# Patient Record
Sex: MALE | Race: WHITE | NOT HISPANIC OR LATINO | Employment: FULL TIME | URBAN - METROPOLITAN AREA
[De-identification: names, ages, dates, MRNs, and addresses within clinical notes are randomized per-mention and may not be internally consistent; named-entity substitution may affect disease eponyms.]

---

## 2023-09-01 DIAGNOSIS — B00.9 HSV (HERPES SIMPLEX VIRUS) INFECTION: Primary | ICD-10-CM

## 2023-09-01 RX ORDER — VALACYCLOVIR HYDROCHLORIDE 500 MG/1
500 TABLET, FILM COATED ORAL 2 TIMES DAILY
Qty: 30 TABLET | Refills: 3 | Status: SHIPPED | OUTPATIENT
Start: 2023-09-01 | End: 2023-09-19

## 2023-09-19 DIAGNOSIS — B00.9 HSV (HERPES SIMPLEX VIRUS) INFECTION: ICD-10-CM

## 2023-09-19 RX ORDER — VALACYCLOVIR HYDROCHLORIDE 500 MG/1
500 TABLET, FILM COATED ORAL 2 TIMES DAILY
Qty: 180 TABLET | Refills: 1 | Status: SHIPPED | OUTPATIENT
Start: 2023-09-19 | End: 2023-11-22 | Stop reason: SDUPTHER

## 2023-11-22 ENCOUNTER — LAB (OUTPATIENT)
Dept: LAB | Facility: LAB | Age: 68
End: 2023-11-22
Payer: MEDICARE

## 2023-11-22 ENCOUNTER — OFFICE VISIT (OUTPATIENT)
Dept: PRIMARY CARE | Facility: CLINIC | Age: 68
End: 2023-11-22
Payer: MEDICARE

## 2023-11-22 VITALS
DIASTOLIC BLOOD PRESSURE: 82 MMHG | OXYGEN SATURATION: 98 % | WEIGHT: 180 LBS | SYSTOLIC BLOOD PRESSURE: 120 MMHG | BODY MASS INDEX: 25.83 KG/M2 | HEART RATE: 73 BPM

## 2023-11-22 DIAGNOSIS — Z12.11 COLON CANCER SCREENING: ICD-10-CM

## 2023-11-22 DIAGNOSIS — E55.9 MILD VITAMIN D DEFICIENCY: ICD-10-CM

## 2023-11-22 DIAGNOSIS — Z13.0 SCREENING FOR DEFICIENCY ANEMIA: ICD-10-CM

## 2023-11-22 DIAGNOSIS — Z00.00 MEDICARE ANNUAL WELLNESS VISIT, SUBSEQUENT: Primary | ICD-10-CM

## 2023-11-22 DIAGNOSIS — Z12.5 PROSTATE CANCER SCREENING: ICD-10-CM

## 2023-11-22 DIAGNOSIS — R53.83 OTHER FATIGUE: ICD-10-CM

## 2023-11-22 DIAGNOSIS — Z13.6 ENCOUNTER FOR ABDOMINAL AORTIC ANEURYSM (AAA) SCREENING: ICD-10-CM

## 2023-11-22 DIAGNOSIS — N52.9 ERECTILE DYSFUNCTION, UNSPECIFIED ERECTILE DYSFUNCTION TYPE: ICD-10-CM

## 2023-11-22 DIAGNOSIS — B00.9 HSV (HERPES SIMPLEX VIRUS) INFECTION: ICD-10-CM

## 2023-11-22 DIAGNOSIS — E78.5 DYSLIPIDEMIA: ICD-10-CM

## 2023-11-22 DIAGNOSIS — D16.5 AMELOBLASTOMA: ICD-10-CM

## 2023-11-22 LAB
ALBUMIN SERPL BCP-MCNC: 4.7 G/DL (ref 3.4–5)
ALP SERPL-CCNC: 50 U/L (ref 33–136)
ALT SERPL W P-5'-P-CCNC: 17 U/L (ref 10–52)
ANION GAP SERPL CALC-SCNC: 13 MMOL/L (ref 10–20)
AST SERPL W P-5'-P-CCNC: 20 U/L (ref 9–39)
BILIRUB SERPL-MCNC: 0.8 MG/DL (ref 0–1.2)
BUN SERPL-MCNC: 18 MG/DL (ref 6–23)
CALCIUM SERPL-MCNC: 9.7 MG/DL (ref 8.6–10.6)
CHLORIDE SERPL-SCNC: 104 MMOL/L (ref 98–107)
CHOLEST SERPL-MCNC: 213 MG/DL (ref 0–199)
CHOLESTEROL/HDL RATIO: 4.9
CO2 SERPL-SCNC: 28 MMOL/L (ref 21–32)
CREAT SERPL-MCNC: 0.98 MG/DL (ref 0.5–1.3)
ERYTHROCYTE [DISTWIDTH] IN BLOOD BY AUTOMATED COUNT: 12.8 % (ref 11.5–14.5)
GFR SERPL CREATININE-BSD FRML MDRD: 85 ML/MIN/1.73M*2
GLUCOSE SERPL-MCNC: 94 MG/DL (ref 74–99)
HCT VFR BLD AUTO: 44.1 % (ref 41–52)
HDLC SERPL-MCNC: 43.3 MG/DL
HGB BLD-MCNC: 14.8 G/DL (ref 13.5–17.5)
LDLC SERPL CALC-MCNC: 149 MG/DL
MCH RBC QN AUTO: 30.4 PG (ref 26–34)
MCHC RBC AUTO-ENTMCNC: 33.6 G/DL (ref 32–36)
MCV RBC AUTO: 91 FL (ref 80–100)
NON HDL CHOLESTEROL: 170 MG/DL (ref 0–149)
NRBC BLD-RTO: 0 /100 WBCS (ref 0–0)
PLATELET # BLD AUTO: 260 X10*3/UL (ref 150–450)
POTASSIUM SERPL-SCNC: 5 MMOL/L (ref 3.5–5.3)
PROT SERPL-MCNC: 7 G/DL (ref 6.4–8.2)
RBC # BLD AUTO: 4.87 X10*6/UL (ref 4.5–5.9)
SODIUM SERPL-SCNC: 140 MMOL/L (ref 136–145)
TRIGL SERPL-MCNC: 106 MG/DL (ref 0–149)
VLDL: 21 MG/DL (ref 0–40)
WBC # BLD AUTO: 6.5 X10*3/UL (ref 4.4–11.3)

## 2023-11-22 PROCEDURE — 1170F FXNL STATUS ASSESSED: CPT | Performed by: STUDENT IN AN ORGANIZED HEALTH CARE EDUCATION/TRAINING PROGRAM

## 2023-11-22 PROCEDURE — 99213 OFFICE O/P EST LOW 20 MIN: CPT | Performed by: STUDENT IN AN ORGANIZED HEALTH CARE EDUCATION/TRAINING PROGRAM

## 2023-11-22 PROCEDURE — 80053 COMPREHEN METABOLIC PANEL: CPT

## 2023-11-22 PROCEDURE — 1159F MED LIST DOCD IN RCRD: CPT | Performed by: STUDENT IN AN ORGANIZED HEALTH CARE EDUCATION/TRAINING PROGRAM

## 2023-11-22 PROCEDURE — 85027 COMPLETE CBC AUTOMATED: CPT

## 2023-11-22 PROCEDURE — G0103 PSA SCREENING: HCPCS

## 2023-11-22 PROCEDURE — 80061 LIPID PANEL: CPT

## 2023-11-22 PROCEDURE — 82306 VITAMIN D 25 HYDROXY: CPT

## 2023-11-22 PROCEDURE — 84443 ASSAY THYROID STIM HORMONE: CPT

## 2023-11-22 PROCEDURE — 84154 ASSAY OF PSA FREE: CPT

## 2023-11-22 PROCEDURE — G0439 PPPS, SUBSEQ VISIT: HCPCS | Performed by: STUDENT IN AN ORGANIZED HEALTH CARE EDUCATION/TRAINING PROGRAM

## 2023-11-22 PROCEDURE — 36415 COLL VENOUS BLD VENIPUNCTURE: CPT

## 2023-11-22 RX ORDER — SILDENAFIL 100 MG/1
TABLET, FILM COATED ORAL
COMMUNITY
Start: 2019-12-18 | End: 2023-11-22 | Stop reason: SDUPTHER

## 2023-11-22 RX ORDER — VALACYCLOVIR HYDROCHLORIDE 500 MG/1
1 TABLET, FILM COATED ORAL 2 TIMES DAILY
COMMUNITY
Start: 2016-09-21 | End: 2023-11-22 | Stop reason: SDUPTHER

## 2023-11-22 RX ORDER — SILDENAFIL 100 MG/1
100 TABLET, FILM COATED ORAL
Qty: 20 TABLET | Refills: 0 | Status: SHIPPED | OUTPATIENT
Start: 2023-11-22

## 2023-11-22 RX ORDER — IBUPROFEN 600 MG/1
600 TABLET ORAL EVERY 6 HOURS PRN
COMMUNITY
Start: 2023-06-07 | End: 2023-11-22 | Stop reason: ALTCHOICE

## 2023-11-22 RX ORDER — ASPIRIN 325 MG
TABLET, DELAYED RELEASE (ENTERIC COATED) ORAL
COMMUNITY
Start: 2014-02-18 | End: 2023-11-22 | Stop reason: ALTCHOICE

## 2023-11-22 RX ORDER — VALACYCLOVIR HYDROCHLORIDE 500 MG/1
500 TABLET, FILM COATED ORAL 2 TIMES DAILY
Qty: 180 TABLET | Refills: 1 | Status: SHIPPED | OUTPATIENT
Start: 2023-11-22

## 2023-11-22 ASSESSMENT — PATIENT HEALTH QUESTIONNAIRE - PHQ9
SUM OF ALL RESPONSES TO PHQ9 QUESTIONS 1 AND 2: 0
1. LITTLE INTEREST OR PLEASURE IN DOING THINGS: NOT AT ALL
2. FEELING DOWN, DEPRESSED OR HOPELESS: NOT AT ALL

## 2023-11-22 ASSESSMENT — ACTIVITIES OF DAILY LIVING (ADL)
GROCERY_SHOPPING: INDEPENDENT
BATHING: INDEPENDENT
DRESSING: INDEPENDENT
MANAGING_FINANCES: INDEPENDENT
DOING_HOUSEWORK: INDEPENDENT
TAKING_MEDICATION: INDEPENDENT

## 2023-11-22 ASSESSMENT — ENCOUNTER SYMPTOMS
DEPRESSION: 0
OCCASIONAL FEELINGS OF UNSTEADINESS: 0
LOSS OF SENSATION IN FEET: 0

## 2023-11-22 NOTE — PROGRESS NOTES
Chris Zabala is a 68 y.o. male seen in Clinic at [facility] [place] by [place]. 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 He presents today for CPE/MCW visit.     #Ameloblastoma   - following with [facility]  - pending surgical resection  [  ] If in need of second or alternate opinion through , will let me know     #Sexual Dysfunction   - PDE5 refill today    - prior hernia operation he thinks may be implicated, 30 years ago   - negative cardiac ROS   - prior testosterone level WNL in 2022     #DLD  - not on statin   [ ] repeat labs today   [ ] consider CT coronary calcium scoring      #HSV  - on Valtrex; refill today    Past Medical History: as above   Past Medical History:   Diagnosis Date    Acute maxillary sinusitis, unspecified 03/21/2014    Acute maxillary sinusitis    Cellulitis of buttock 07/29/2015    Cellulitis of buttock    Ganglion, unspecified site 07/31/2015    Ganglion    Personal history of other diseases of the respiratory system 11/11/2015    History of acute sinusitis    Personal history of other diseases of the respiratory system     History of asthma    Personal history of other diseases of urinary system 07/14/2015    History of hematuria    Personal history of other specified conditions 07/27/2015    History of epistaxis     Subspecialty Medical Care: [facility]     Past Surgical History: Hernia surgery (35 years ago); lipoma removal   Past Surgical History:   Procedure Laterality Date    COLONOSCOPY  07/14/2015    Complete Colonoscopy    HERNIA REPAIR  07/14/2015    Hernia Repair    TONSILLECTOMY  07/27/2015    Tonsillectomy     Medications:  Current Outpatient Medications:     sildenafil (Viagra) 100 mg tablet, Take 1 tablet (100 mg) by mouth once daily., Disp: 20 tablet, Rfl: 0    valACYclovir (Valtrex) 500 mg  tablet, Take 1 tablet (500 mg) by mouth 2 times a day., Disp: 180 tablet, Rfl: 1  Pharmacy: [facility]    Allergies:   - Methacarbamol--intolerance  - Penicillin--in youth (sensitivity); has tolerated in adulthood without issue  Allergies   Allergen Reactions    Cefaclor Unknown    Clarithromycin Unknown    House Dust Mite Unknown    Methocarbamol Unknown    Penicillin Unknown     Immunizations:   - Flu UTD 2023  - COVID UTD 2023  - RSV discussed, patient will likely defer this year   - Tdap due 2025  - PCV-20 deferred but considering  - prior Zostavax, considering Shingrix     Family History: sister with DLD (despite being thin, vegetarian)  No family history on file.    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[employer])   Activities: musician   Drug Use: smoker from age [age]  Diet: no dietary concerns  Depression/Anxiety: depression 2/2 world events  Sexuality/Contraception/Menstrual History: STI screening negative 2022  Sleep: no sleep concerns      Patient Information:  Health Insurance: has insurance   Transportation: [facility] POA/Guardian: emergency contact, brother [phone]   Contact Information: [phone]    Visit Vitals  /82   Pulse 73   Wt 81.6 kg (180 lb)   SpO2 98%   BMI 25.83 kg/m²   BSA 2.01 m²      PHYSICAL EXAM:   General: well appearing  male, NAD, pleasant and engaged in encounter    HEENT: NCAT, MMM; no obvious facial deformity at this time despite diagnosis of Ameloblastoma (possible slight asymmetry/swelling on R side compared to L)  CV: RRR, no m/r/g  PULM: CTAB, non-labored respirations   ABD: soft, NT, ND  : no suprapubic or CVA tenderness   EXT: WWP, no significant edema   SKIN: no rashes noted   NEURO: A&Ox4, symmetric facies, no gross motor or sensory deficits, normal gait  PSYCH: pleasant mood, appropriate affect     Assessment/Plan    Chris Zabala is a 68 y.o. male  seen in Clinic at [facility] by [place]. Cornelio Ruelas on 11/22/23 for routine care, as well as for management of the following chronic medical conditions: DLD (mild, not on statin), ameloblastoma (following with dental surgery at [facility], pending surgical resection). He presents today for CPE/MCW visit.     #Ameloblastoma   - following with dental surgery at [facility]  - pending surgical resection  [  ] If in need of second or alternate opinion through , will let me know     #Sexual Dysfunction   - PDE5 refill today    - prior hernia operation he thinks may be implicated, 30 years ago   - negative cardiac ROS   - prior testosterone level WNL in 2022     #DLD  - not on statin   [ ] repeat labs today   [ ] consider CT coronary calcium scoring      #HSV  - on Valtrex; refill today    #Health Maintenance    Cancer Screening  - Colorectal Cancer Screening: colo referral in past deferred; COLOGUARD ordered today   - Lung Cancer Screening: not candidate  - Prostate Cancer Screening: labs today      Laboratory Screening  - Lipid Screen: labs today   - ASCVD Score: >7.5%, defers statin for now, will consider CAC scoring   - A1C, glucose screen: labs today   - STI, HIV, Hep B screen: negative 2022  - Hep C screen: negative 2022     Imaging Screening  - AAA screening: screening previously ordered, not yet completed, again ordered today    - Osteoporosis/DEXA screening: NA     Immunizations  - Influenza: UTD 2023  - COVID: UTD 2023  - RSV: defers   - Tdap: UTD (due 2025)  - Prevnar, Pneumovax: defers today but considering    - Shingrix: recommended (prior Zostavax)      Other Screening  - Health Literacy Assessment: appropriate   - Depression screen: partial positive; continue to monitor  - Home safety/partner violence screen: negative  - Alcohol/tobacco/drug use screen: former smoker  - Healthcare POA/Advanced Directives: brother      Referrals: labs, AAA screening, Cologuard, medication refills    Patient Discussion:    Please call back  the office with any questions at [phone]. In the case of an emergency, please call 911 or go to the nearest Emergency Department.      Cornelio Ruelas MD  Internal Medicine-Pediatrics  [facility] [place] [address], [address]  P: [phone], F: [phone]

## 2023-11-22 NOTE — PATIENT INSTRUCTIONS
Labs in [address] or [address] today.     Ultrasound of the AORTA given your remote smoking history to assess for possibility of aneurysm.   Can call [phone] to schedule.     Paper script for Sildenafil  Script for Valtrex electronically sent    Consider Coronary Calcium Scoring    Let me know if you need surgical referral within  for the ameloblatoma     Consider Shingrix vaccine, Prevnar-20 vaccine     Try COLOGUARD (stool test) as alternative to colonoscopy for colon cancer screening     Best,  Dr. Ruelas

## 2023-11-23 LAB
25(OH)D3 SERPL-MCNC: 43 NG/ML (ref 30–100)
TSH SERPL-ACNC: 1.57 MIU/L (ref 0.44–3.98)

## 2023-11-25 LAB
PSA FREE MFR SERPL: 22 %
PSA FREE SERPL-MCNC: 0.7 NG/ML
PSA SERPL IA-MCNC: 3.2 NG/ML (ref 0–4)

## 2023-12-19 LAB — NONINV COLON CA DNA+OCC BLD SCRN STL QL: NEGATIVE

## 2024-03-14 DIAGNOSIS — D16.4 BENIGN NEOPLASM OF BONES OF SKULL AND FACE: Primary | ICD-10-CM

## 2024-03-15 ENCOUNTER — HOSPITAL ENCOUNTER (OUTPATIENT)
Dept: RADIOLOGY | Facility: CLINIC | Age: 69
Discharge: HOME | End: 2024-03-15
Payer: MEDICARE

## 2024-03-15 DIAGNOSIS — D16.4 BENIGN NEOPLASM OF BONES OF SKULL AND FACE: ICD-10-CM

## 2024-03-15 PROCEDURE — 70486 CT MAXILLOFACIAL W/O DYE: CPT | Performed by: RADIOLOGY

## 2024-03-15 PROCEDURE — 70486 CT MAXILLOFACIAL W/O DYE: CPT

## 2024-03-20 ENCOUNTER — ANESTHESIA EVENT (OUTPATIENT)
Dept: OPERATING ROOM | Facility: HOSPITAL | Age: 69
End: 2024-03-20
Payer: MEDICARE

## 2024-03-21 ENCOUNTER — ANESTHESIA (OUTPATIENT)
Dept: OPERATING ROOM | Facility: HOSPITAL | Age: 69
End: 2024-03-21
Payer: MEDICARE

## 2024-03-21 ENCOUNTER — APPOINTMENT (OUTPATIENT)
Dept: RADIOLOGY | Facility: HOSPITAL | Age: 69
End: 2024-03-21
Payer: MEDICARE

## 2024-03-21 ENCOUNTER — HOSPITAL ENCOUNTER (OUTPATIENT)
Facility: HOSPITAL | Age: 69
LOS: 1 days | Discharge: HOME | End: 2024-03-22
Attending: DENTIST | Admitting: DENTIST
Payer: MEDICARE

## 2024-03-21 DIAGNOSIS — D16.4 BENIGN NEOPLASM OF BONES OF SKULL AND FACE: Primary | ICD-10-CM

## 2024-03-21 DIAGNOSIS — R11.0 NAUSEA: ICD-10-CM

## 2024-03-21 DIAGNOSIS — G89.18 POST-OP PAIN: ICD-10-CM

## 2024-03-21 DIAGNOSIS — K59.03 DRUG-INDUCED CONSTIPATION: ICD-10-CM

## 2024-03-21 DIAGNOSIS — Z91.89 AT RISK FOR INFECTION: ICD-10-CM

## 2024-03-21 DIAGNOSIS — R09.81 NASAL CONGESTION: ICD-10-CM

## 2024-03-21 LAB
ALBUMIN SERPL BCP-MCNC: 4.2 G/DL (ref 3.4–5)
ANION GAP SERPL CALC-SCNC: 12 MMOL/L (ref 10–20)
BUN SERPL-MCNC: 22 MG/DL (ref 6–23)
CALCIUM SERPL-MCNC: 9.2 MG/DL (ref 8.6–10.6)
CHLORIDE SERPL-SCNC: 103 MMOL/L (ref 98–107)
CO2 SERPL-SCNC: 25 MMOL/L (ref 21–32)
CREAT SERPL-MCNC: 0.96 MG/DL (ref 0.5–1.3)
EGFRCR SERPLBLD CKD-EPI 2021: 86 ML/MIN/1.73M*2
GLUCOSE SERPL-MCNC: 135 MG/DL (ref 74–99)
PHOSPHATE SERPL-MCNC: 3.3 MG/DL (ref 2.5–4.9)
POTASSIUM SERPL-SCNC: 4.5 MMOL/L (ref 3.5–5.3)
SODIUM SERPL-SCNC: 135 MMOL/L (ref 136–145)

## 2024-03-21 PROCEDURE — 2500000004 HC RX 250 GENERAL PHARMACY W/ HCPCS (ALT 636 FOR OP/ED): Performed by: ANESTHESIOLOGIST ASSISTANT

## 2024-03-21 PROCEDURE — A4217 STERILE WATER/SALINE, 500 ML: HCPCS | Performed by: DENTIST

## 2024-03-21 PROCEDURE — 2500000001 HC RX 250 WO HCPCS SELF ADMINISTERED DRUGS (ALT 637 FOR MEDICARE OP): Performed by: ANESTHESIOLOGY

## 2024-03-21 PROCEDURE — C1729 CATH, DRAINAGE: HCPCS | Performed by: DENTIST

## 2024-03-21 PROCEDURE — 2500000005 HC RX 250 GENERAL PHARMACY W/O HCPCS: Performed by: ANESTHESIOLOGIST ASSISTANT

## 2024-03-21 PROCEDURE — 2720000007 HC OR 272 NO HCPCS: Performed by: DENTIST

## 2024-03-21 PROCEDURE — 88331 PATH CONSLTJ SURG 1 BLK 1SPC: CPT | Mod: TC,SUR | Performed by: PATHOLOGY

## 2024-03-21 PROCEDURE — 87102 FUNGUS ISOLATION CULTURE: CPT | Performed by: DENTIST

## 2024-03-21 PROCEDURE — A21048: Performed by: ANESTHESIOLOGY

## 2024-03-21 PROCEDURE — 80069 RENAL FUNCTION PANEL: CPT | Performed by: DENTIST

## 2024-03-21 PROCEDURE — 87205 SMEAR GRAM STAIN: CPT | Performed by: DENTIST

## 2024-03-21 PROCEDURE — 7100000001 HC RECOVERY ROOM TIME - INITIAL BASE CHARGE: Performed by: DENTIST

## 2024-03-21 PROCEDURE — 2500000001 HC RX 250 WO HCPCS SELF ADMINISTERED DRUGS (ALT 637 FOR MEDICARE OP): Performed by: DENTIST

## 2024-03-21 PROCEDURE — 36415 COLL VENOUS BLD VENIPUNCTURE: CPT | Performed by: DENTIST

## 2024-03-21 PROCEDURE — 96372 THER/PROPH/DIAG INJ SC/IM: CPT | Performed by: DENTIST

## 2024-03-21 PROCEDURE — 2500000004 HC RX 250 GENERAL PHARMACY W/ HCPCS (ALT 636 FOR OP/ED): Performed by: DENTIST

## 2024-03-21 PROCEDURE — 7100000002 HC RECOVERY ROOM TIME - EACH INCREMENTAL 1 MINUTE: Performed by: DENTIST

## 2024-03-21 PROCEDURE — 88309 TISSUE EXAM BY PATHOLOGIST: CPT | Performed by: DENTIST

## 2024-03-21 PROCEDURE — 3700000001 HC GENERAL ANESTHESIA TIME - INITIAL BASE CHARGE: Performed by: DENTIST

## 2024-03-21 PROCEDURE — 3600000007 HC OR TIME - EACH INCREMENTAL 1 MINUTE - PROCEDURE LEVEL TWO: Performed by: DENTIST

## 2024-03-21 PROCEDURE — 70486 CT MAXILLOFACIAL W/O DYE: CPT

## 2024-03-21 PROCEDURE — 70486 CT MAXILLOFACIAL W/O DYE: CPT | Performed by: RADIOLOGY

## 2024-03-21 PROCEDURE — A21048: Performed by: ANESTHESIOLOGIST ASSISTANT

## 2024-03-21 PROCEDURE — 2500000001 HC RX 250 WO HCPCS SELF ADMINISTERED DRUGS (ALT 637 FOR MEDICARE OP): Performed by: ANESTHESIOLOGIST ASSISTANT

## 2024-03-21 PROCEDURE — 2500000004 HC RX 250 GENERAL PHARMACY W/ HCPCS (ALT 636 FOR OP/ED): Performed by: ANESTHESIOLOGY

## 2024-03-21 PROCEDURE — 88305 TISSUE EXAM BY PATHOLOGIST: CPT | Performed by: DENTIST

## 2024-03-21 PROCEDURE — 3700000002 HC GENERAL ANESTHESIA TIME - EACH INCREMENTAL 1 MINUTE: Performed by: DENTIST

## 2024-03-21 PROCEDURE — 88305 TISSUE EXAM BY PATHOLOGIST: CPT | Mod: TC,59,SUR,91 | Performed by: DENTIST

## 2024-03-21 PROCEDURE — 7100000011 HC EXTENDED STAY RECOVERY HOURLY - NURSING UNIT

## 2024-03-21 PROCEDURE — 3600000002 HC OR TIME - INITIAL BASE CHARGE - PROCEDURE LEVEL TWO: Performed by: DENTIST

## 2024-03-21 PROCEDURE — G0378 HOSPITAL OBSERVATION PER HR: HCPCS

## 2024-03-21 PROCEDURE — 2500000005 HC RX 250 GENERAL PHARMACY W/O HCPCS: Performed by: DENTIST

## 2024-03-21 RX ORDER — IBUPROFEN 600 MG/1
600 TABLET ORAL 3 TIMES DAILY
Status: DISCONTINUED | OUTPATIENT
Start: 2024-03-21 | End: 2024-03-22 | Stop reason: HOSPADM

## 2024-03-21 RX ORDER — PHENYLEPHRINE HCL IN 0.9% NACL 0.4MG/10ML
SYRINGE (ML) INTRAVENOUS AS NEEDED
Status: DISCONTINUED | OUTPATIENT
Start: 2024-03-21 | End: 2024-03-21

## 2024-03-21 RX ORDER — CHLORHEXIDINE GLUCONATE ORAL RINSE 1.2 MG/ML
15 SOLUTION DENTAL 3 TIMES DAILY
Status: DISCONTINUED | OUTPATIENT
Start: 2024-03-21 | End: 2024-03-22 | Stop reason: HOSPADM

## 2024-03-21 RX ORDER — OXYCODONE HYDROCHLORIDE 5 MG/1
10 TABLET ORAL EVERY 4 HOURS PRN
Status: DISCONTINUED | OUTPATIENT
Start: 2024-03-21 | End: 2024-03-21 | Stop reason: HOSPADM

## 2024-03-21 RX ORDER — ACETAMINOPHEN 160 MG/5ML
650 SOLUTION ORAL EVERY 6 HOURS
Status: DISCONTINUED | OUTPATIENT
Start: 2024-03-21 | End: 2024-03-22 | Stop reason: HOSPADM

## 2024-03-21 RX ORDER — SODIUM CHLORIDE, SODIUM LACTATE, POTASSIUM CHLORIDE, CALCIUM CHLORIDE 600; 310; 30; 20 MG/100ML; MG/100ML; MG/100ML; MG/100ML
INJECTION, SOLUTION INTRAVENOUS CONTINUOUS PRN
Status: DISCONTINUED | OUTPATIENT
Start: 2024-03-21 | End: 2024-03-21

## 2024-03-21 RX ORDER — NALOXONE HYDROCHLORIDE 0.4 MG/ML
0.2 INJECTION, SOLUTION INTRAMUSCULAR; INTRAVENOUS; SUBCUTANEOUS EVERY 5 MIN PRN
Status: DISCONTINUED | OUTPATIENT
Start: 2024-03-21 | End: 2024-03-22 | Stop reason: HOSPADM

## 2024-03-21 RX ORDER — ROCURONIUM BROMIDE 10 MG/ML
INJECTION, SOLUTION INTRAVENOUS AS NEEDED
Status: DISCONTINUED | OUTPATIENT
Start: 2024-03-21 | End: 2024-03-21

## 2024-03-21 RX ORDER — MIDAZOLAM HYDROCHLORIDE 1 MG/ML
INJECTION INTRAMUSCULAR; INTRAVENOUS AS NEEDED
Status: DISCONTINUED | OUTPATIENT
Start: 2024-03-21 | End: 2024-03-21

## 2024-03-21 RX ORDER — FENTANYL CITRATE 50 UG/ML
INJECTION, SOLUTION INTRAMUSCULAR; INTRAVENOUS AS NEEDED
Status: DISCONTINUED | OUTPATIENT
Start: 2024-03-21 | End: 2024-03-21

## 2024-03-21 RX ORDER — ESMOLOL HYDROCHLORIDE 10 MG/ML
INJECTION INTRAVENOUS AS NEEDED
Status: DISCONTINUED | OUTPATIENT
Start: 2024-03-21 | End: 2024-03-21

## 2024-03-21 RX ORDER — OXYCODONE HYDROCHLORIDE 5 MG/1
5 TABLET ORAL EVERY 6 HOURS PRN
Status: DISCONTINUED | OUTPATIENT
Start: 2024-03-21 | End: 2024-03-22 | Stop reason: HOSPADM

## 2024-03-21 RX ORDER — SODIUM CHLORIDE 9 MG/ML
INJECTION, SOLUTION INTRAVENOUS CONTINUOUS PRN
Status: COMPLETED | OUTPATIENT
Start: 2024-03-21 | End: 2024-03-21

## 2024-03-21 RX ORDER — OXYCODONE HYDROCHLORIDE 5 MG/1
10 TABLET ORAL EVERY 4 HOURS PRN
Status: DISCONTINUED | OUTPATIENT
Start: 2024-03-21 | End: 2024-03-22 | Stop reason: HOSPADM

## 2024-03-21 RX ORDER — LIDOCAINE HYDROCHLORIDE 40 MG/ML
SOLUTION TOPICAL AS NEEDED
Status: DISCONTINUED | OUTPATIENT
Start: 2024-03-21 | End: 2024-03-21

## 2024-03-21 RX ORDER — ONDANSETRON HYDROCHLORIDE 2 MG/ML
INJECTION, SOLUTION INTRAVENOUS AS NEEDED
Status: DISCONTINUED | OUTPATIENT
Start: 2024-03-21 | End: 2024-03-21

## 2024-03-21 RX ORDER — ENOXAPARIN SODIUM 100 MG/ML
40 INJECTION SUBCUTANEOUS EVERY 24 HOURS
Status: DISCONTINUED | OUTPATIENT
Start: 2024-03-21 | End: 2024-03-22 | Stop reason: HOSPADM

## 2024-03-21 RX ORDER — HYDROMORPHONE HYDROCHLORIDE 1 MG/ML
INJECTION, SOLUTION INTRAMUSCULAR; INTRAVENOUS; SUBCUTANEOUS AS NEEDED
Status: DISCONTINUED | OUTPATIENT
Start: 2024-03-21 | End: 2024-03-21

## 2024-03-21 RX ORDER — OXYCODONE AND ACETAMINOPHEN 5; 325 MG/1; MG/1
1 TABLET ORAL EVERY 4 HOURS PRN
Status: DISCONTINUED | OUTPATIENT
Start: 2024-03-21 | End: 2024-03-21 | Stop reason: HOSPADM

## 2024-03-21 RX ORDER — BACITRACIN ZINC 500 UNIT/G
OINTMENT IN PACKET (EA) TOPICAL AS NEEDED
Status: DISCONTINUED | OUTPATIENT
Start: 2024-03-21 | End: 2024-03-21 | Stop reason: HOSPADM

## 2024-03-21 RX ORDER — MIDAZOLAM HYDROCHLORIDE 1 MG/ML
1 INJECTION INTRAMUSCULAR; INTRAVENOUS ONCE AS NEEDED
Status: DISCONTINUED | OUTPATIENT
Start: 2024-03-21 | End: 2024-03-21 | Stop reason: HOSPADM

## 2024-03-21 RX ORDER — LEVOFLOXACIN 500 MG/1
500 TABLET, FILM COATED ORAL
Status: DISCONTINUED | OUTPATIENT
Start: 2024-03-21 | End: 2024-03-21

## 2024-03-21 RX ORDER — HYDROMORPHONE HYDROCHLORIDE 1 MG/ML
0.5 INJECTION, SOLUTION INTRAMUSCULAR; INTRAVENOUS; SUBCUTANEOUS EVERY 5 MIN PRN
Status: DISCONTINUED | OUTPATIENT
Start: 2024-03-21 | End: 2024-03-21 | Stop reason: HOSPADM

## 2024-03-21 RX ORDER — CEFAZOLIN 1 G/1
INJECTION, POWDER, FOR SOLUTION INTRAVENOUS AS NEEDED
Status: DISCONTINUED | OUTPATIENT
Start: 2024-03-21 | End: 2024-03-21

## 2024-03-21 RX ORDER — SODIUM CHLORIDE 0.9 G/100ML
IRRIGANT IRRIGATION AS NEEDED
Status: DISCONTINUED | OUTPATIENT
Start: 2024-03-21 | End: 2024-03-21 | Stop reason: HOSPADM

## 2024-03-21 RX ORDER — LIDOCAINE HYDROCHLORIDE 10 MG/ML
0.1 INJECTION INFILTRATION; PERINEURAL ONCE
Status: DISCONTINUED | OUTPATIENT
Start: 2024-03-21 | End: 2024-03-21 | Stop reason: HOSPADM

## 2024-03-21 RX ORDER — SODIUM CHLORIDE, SODIUM LACTATE, POTASSIUM CHLORIDE, CALCIUM CHLORIDE 600; 310; 30; 20 MG/100ML; MG/100ML; MG/100ML; MG/100ML
100 INJECTION, SOLUTION INTRAVENOUS CONTINUOUS
Status: DISCONTINUED | OUTPATIENT
Start: 2024-03-21 | End: 2024-03-21 | Stop reason: HOSPADM

## 2024-03-21 RX ORDER — LIDOCAINE HYDROCHLORIDE AND EPINEPHRINE 10; 10 MG/ML; UG/ML
INJECTION, SOLUTION INFILTRATION; PERINEURAL AS NEEDED
Status: DISCONTINUED | OUTPATIENT
Start: 2024-03-21 | End: 2024-03-21 | Stop reason: HOSPADM

## 2024-03-21 RX ORDER — LIDOCAINE HCL/PF 100 MG/5ML
SYRINGE (ML) INTRAVENOUS AS NEEDED
Status: DISCONTINUED | OUTPATIENT
Start: 2024-03-21 | End: 2024-03-21

## 2024-03-21 RX ORDER — LABETALOL HYDROCHLORIDE 5 MG/ML
5 INJECTION, SOLUTION INTRAVENOUS ONCE AS NEEDED
Status: DISCONTINUED | OUTPATIENT
Start: 2024-03-21 | End: 2024-03-21 | Stop reason: HOSPADM

## 2024-03-21 RX ORDER — TRIPROLIDINE/PSEUDOEPHEDRINE 2.5MG-60MG
600 TABLET ORAL 3 TIMES DAILY
Status: DISCONTINUED | OUTPATIENT
Start: 2024-03-21 | End: 2024-03-22 | Stop reason: HOSPADM

## 2024-03-21 RX ORDER — LEVOFLOXACIN 500 MG/1
500 TABLET, FILM COATED ORAL ONCE
Status: COMPLETED | OUTPATIENT
Start: 2024-03-21 | End: 2024-03-21

## 2024-03-21 RX ORDER — DROPERIDOL 2.5 MG/ML
0.62 INJECTION, SOLUTION INTRAMUSCULAR; INTRAVENOUS ONCE AS NEEDED
Status: DISCONTINUED | OUTPATIENT
Start: 2024-03-21 | End: 2024-03-21 | Stop reason: HOSPADM

## 2024-03-21 RX ORDER — PROPOFOL 10 MG/ML
INJECTION, EMULSION INTRAVENOUS AS NEEDED
Status: DISCONTINUED | OUTPATIENT
Start: 2024-03-21 | End: 2024-03-21

## 2024-03-21 RX ORDER — SODIUM CHLORIDE, SODIUM LACTATE, POTASSIUM CHLORIDE, CALCIUM CHLORIDE 600; 310; 30; 20 MG/100ML; MG/100ML; MG/100ML; MG/100ML
125 INJECTION, SOLUTION INTRAVENOUS CONTINUOUS
Status: DISCONTINUED | OUTPATIENT
Start: 2024-03-21 | End: 2024-03-22

## 2024-03-21 RX ORDER — ONDANSETRON 4 MG/1
4 TABLET, FILM COATED ORAL EVERY 8 HOURS PRN
Status: DISCONTINUED | OUTPATIENT
Start: 2024-03-21 | End: 2024-03-22 | Stop reason: HOSPADM

## 2024-03-21 RX ORDER — OXYMETAZOLINE HCL 0.05 %
2 SPRAY, NON-AEROSOL (ML) NASAL EVERY 12 HOURS PRN
Status: DISCONTINUED | OUTPATIENT
Start: 2024-03-21 | End: 2024-03-22 | Stop reason: HOSPADM

## 2024-03-21 RX ORDER — ACETAMINOPHEN 325 MG/1
650 TABLET ORAL EVERY 6 HOURS
Status: DISCONTINUED | OUTPATIENT
Start: 2024-03-21 | End: 2024-03-22 | Stop reason: HOSPADM

## 2024-03-21 RX ORDER — DEXAMETHASONE SODIUM PHOSPHATE 100 MG/10ML
8 INJECTION INTRAMUSCULAR; INTRAVENOUS EVERY 8 HOURS
Status: DISCONTINUED | OUTPATIENT
Start: 2024-03-21 | End: 2024-03-22 | Stop reason: HOSPADM

## 2024-03-21 RX ORDER — GLYCOPYRROLATE 0.2 MG/ML
INJECTION INTRAMUSCULAR; INTRAVENOUS AS NEEDED
Status: DISCONTINUED | OUTPATIENT
Start: 2024-03-21 | End: 2024-03-21

## 2024-03-21 RX ORDER — HYDROMORPHONE HYDROCHLORIDE 1 MG/ML
0.2 INJECTION, SOLUTION INTRAMUSCULAR; INTRAVENOUS; SUBCUTANEOUS EVERY 5 MIN PRN
Status: DISCONTINUED | OUTPATIENT
Start: 2024-03-21 | End: 2024-03-21 | Stop reason: HOSPADM

## 2024-03-21 RX ORDER — ONDANSETRON HYDROCHLORIDE 2 MG/ML
4 INJECTION, SOLUTION INTRAVENOUS EVERY 8 HOURS PRN
Status: DISCONTINUED | OUTPATIENT
Start: 2024-03-21 | End: 2024-03-22 | Stop reason: HOSPADM

## 2024-03-21 RX ORDER — ACETAMINOPHEN 325 MG/1
650 TABLET ORAL EVERY 4 HOURS PRN
Status: DISCONTINUED | OUTPATIENT
Start: 2024-03-21 | End: 2024-03-21 | Stop reason: HOSPADM

## 2024-03-21 RX ORDER — METOCLOPRAMIDE HYDROCHLORIDE 5 MG/ML
10 INJECTION INTRAMUSCULAR; INTRAVENOUS ONCE AS NEEDED
Status: DISCONTINUED | OUTPATIENT
Start: 2024-03-21 | End: 2024-03-21 | Stop reason: HOSPADM

## 2024-03-21 RX ADMIN — SUGAMMADEX 200 MG: 100 INJECTION, SOLUTION INTRAVENOUS at 10:02

## 2024-03-21 RX ADMIN — MIDAZOLAM HYDROCHLORIDE 1 MG: 1 INJECTION, SOLUTION INTRAMUSCULAR; INTRAVENOUS at 07:22

## 2024-03-21 RX ADMIN — HYDROMORPHONE HYDROCHLORIDE 0.2 MG: 1 INJECTION, SOLUTION INTRAMUSCULAR; INTRAVENOUS; SUBCUTANEOUS at 09:54

## 2024-03-21 RX ADMIN — ENOXAPARIN SODIUM 40 MG: 100 INJECTION SUBCUTANEOUS at 20:59

## 2024-03-21 RX ADMIN — GLYCOPYRROLATE 0.1 MG: 0.2 INJECTION, SOLUTION INTRAMUSCULAR; INTRAVENOUS at 07:22

## 2024-03-21 RX ADMIN — CHLORHEXIDINE GLUCONATE 15 ML: 1.2 SOLUTION ORAL at 17:25

## 2024-03-21 RX ADMIN — Medication 80 MCG: at 07:49

## 2024-03-21 RX ADMIN — FENTANYL CITRATE 100 MCG: 50 INJECTION, SOLUTION INTRAMUSCULAR; INTRAVENOUS at 07:25

## 2024-03-21 RX ADMIN — SODIUM CHLORIDE, POTASSIUM CHLORIDE, SODIUM LACTATE AND CALCIUM CHLORIDE 100 ML/HR: 600; 310; 30; 20 INJECTION, SOLUTION INTRAVENOUS at 10:30

## 2024-03-21 RX ADMIN — Medication 80 MCG: at 09:17

## 2024-03-21 RX ADMIN — PROPOFOL 100 MG: 10 INJECTION, EMULSION INTRAVENOUS at 07:25

## 2024-03-21 RX ADMIN — ONDANSETRON 4 MG: 2 INJECTION INTRAMUSCULAR; INTRAVENOUS at 21:03

## 2024-03-21 RX ADMIN — HYDROMORPHONE HYDROCHLORIDE 0.2 MG: 1 INJECTION, SOLUTION INTRAMUSCULAR; INTRAVENOUS; SUBCUTANEOUS at 09:47

## 2024-03-21 RX ADMIN — HYDROMORPHONE HYDROCHLORIDE 0.4 MG: 1 INJECTION, SOLUTION INTRAMUSCULAR; INTRAVENOUS; SUBCUTANEOUS at 09:02

## 2024-03-21 RX ADMIN — CEFAZOLIN 2 G: 1 INJECTION, POWDER, FOR SOLUTION INTRAMUSCULAR; INTRAVENOUS at 07:37

## 2024-03-21 RX ADMIN — LIDOCAINE HYDROCHLORIDE 4 ML: 40 SOLUTION TOPICAL at 07:29

## 2024-03-21 RX ADMIN — Medication 120 MCG: at 07:56

## 2024-03-21 RX ADMIN — LIDOCAINE HYDROCHLORIDE 100 MG: 20 INJECTION INTRAVENOUS at 07:25

## 2024-03-21 RX ADMIN — Medication 120 MCG: at 07:40

## 2024-03-21 RX ADMIN — DEXAMETHASONE SODIUM PHOSPHATE 8 MG: 10 INJECTION INTRAMUSCULAR; INTRAVENOUS at 17:27

## 2024-03-21 RX ADMIN — IBUPROFEN 600 MG: 200 SUSPENSION ORAL at 20:58

## 2024-03-21 RX ADMIN — LEVOFLOXACIN 500 MG: 500 TABLET, FILM COATED ORAL at 17:30

## 2024-03-21 RX ADMIN — CHLORHEXIDINE GLUCONATE 15 ML: 1.2 SOLUTION ORAL at 20:59

## 2024-03-21 RX ADMIN — ESMOLOL HYDROCHLORIDE 30 MG: 10 INJECTION, SOLUTION INTRAVENOUS at 08:36

## 2024-03-21 RX ADMIN — Medication 120 MCG: at 07:48

## 2024-03-21 RX ADMIN — OXYCODONE HYDROCHLORIDE 5 MG: 5 TABLET ORAL at 17:28

## 2024-03-21 RX ADMIN — ROCURONIUM BROMIDE 80 MG: 10 INJECTION INTRAVENOUS at 07:25

## 2024-03-21 RX ADMIN — Medication 80 MCG: at 07:43

## 2024-03-21 RX ADMIN — ONDANSETRON 4 MG: 2 INJECTION INTRAMUSCULAR; INTRAVENOUS at 09:43

## 2024-03-21 RX ADMIN — MIDAZOLAM HYDROCHLORIDE 1 MG: 1 INJECTION, SOLUTION INTRAMUSCULAR; INTRAVENOUS at 07:24

## 2024-03-21 RX ADMIN — SODIUM CHLORIDE, POTASSIUM CHLORIDE, SODIUM LACTATE AND CALCIUM CHLORIDE: 600; 310; 30; 20 INJECTION, SOLUTION INTRAVENOUS at 07:22

## 2024-03-21 RX ADMIN — ACETAMINOPHEN 650 MG: 650 SOLUTION ORAL at 17:26

## 2024-03-21 RX ADMIN — OXYCODONE HYDROCHLORIDE AND ACETAMINOPHEN 1 TABLET: 5; 325 TABLET ORAL at 11:13

## 2024-03-21 RX ADMIN — EPHEDRINE SULFATE 5 MG: 50 INJECTION, SOLUTION INTRAVENOUS at 08:00

## 2024-03-21 RX ADMIN — DEXAMETHASONE SODIUM PHOSPHATE 10 MG: 4 INJECTION, SOLUTION INTRA-ARTICULAR; INTRALESIONAL; INTRAMUSCULAR; INTRAVENOUS; SOFT TISSUE at 07:48

## 2024-03-21 SDOH — SOCIAL STABILITY: SOCIAL INSECURITY: ABUSE: ADULT

## 2024-03-21 SDOH — SOCIAL STABILITY: SOCIAL INSECURITY: WERE YOU ABLE TO COMPLETE ALL THE BEHAVIORAL HEALTH SCREENINGS?: YES

## 2024-03-21 SDOH — SOCIAL STABILITY: SOCIAL INSECURITY: HAS ANYONE EVER THREATENED TO HURT YOUR FAMILY OR YOUR PETS?: NO

## 2024-03-21 SDOH — SOCIAL STABILITY: SOCIAL INSECURITY: ARE THERE ANY APPARENT SIGNS OF INJURIES/BEHAVIORS THAT COULD BE RELATED TO ABUSE/NEGLECT?: NO

## 2024-03-21 SDOH — SOCIAL STABILITY: SOCIAL INSECURITY: DO YOU FEEL UNSAFE GOING BACK TO THE PLACE WHERE YOU ARE LIVING?: NO

## 2024-03-21 SDOH — SOCIAL STABILITY: SOCIAL INSECURITY: DO YOU FEEL ANYONE HAS EXPLOITED OR TAKEN ADVANTAGE OF YOU FINANCIALLY OR OF YOUR PERSONAL PROPERTY?: NO

## 2024-03-21 SDOH — SOCIAL STABILITY: SOCIAL INSECURITY: HAVE YOU HAD THOUGHTS OF HARMING ANYONE ELSE?: NO

## 2024-03-21 SDOH — SOCIAL STABILITY: SOCIAL INSECURITY: DOES ANYONE TRY TO KEEP YOU FROM HAVING/CONTACTING OTHER FRIENDS OR DOING THINGS OUTSIDE YOUR HOME?: NO

## 2024-03-21 SDOH — SOCIAL STABILITY: SOCIAL INSECURITY: ARE YOU OR HAVE YOU BEEN THREATENED OR ABUSED PHYSICALLY, EMOTIONALLY, OR SEXUALLY BY ANYONE?: NO

## 2024-03-21 ASSESSMENT — COGNITIVE AND FUNCTIONAL STATUS - GENERAL
DAILY ACTIVITIY SCORE: 24
PATIENT BASELINE BEDBOUND: NO
MOBILITY SCORE: 24
MOBILITY SCORE: 24
DAILY ACTIVITIY SCORE: 24

## 2024-03-21 ASSESSMENT — ACTIVITIES OF DAILY LIVING (ADL)
FEEDING YOURSELF: INDEPENDENT
JUDGMENT_ADEQUATE_SAFELY_COMPLETE_DAILY_ACTIVITIES: YES
DRESSING YOURSELF: INDEPENDENT
LACK_OF_TRANSPORTATION: NO
WALKS IN HOME: INDEPENDENT
GROOMING: INDEPENDENT
PATIENT'S MEMORY ADEQUATE TO SAFELY COMPLETE DAILY ACTIVITIES?: YES
ADEQUATE_TO_COMPLETE_ADL: YES
TOILETING: INDEPENDENT
BATHING: INDEPENDENT
HEARING - LEFT EAR: FUNCTIONAL
HEARING - RIGHT EAR: FUNCTIONAL

## 2024-03-21 ASSESSMENT — PAIN SCALES - GENERAL
PAINLEVEL_OUTOF10: 0 - NO PAIN
PAINLEVEL_OUTOF10: 0 - NO PAIN
PAINLEVEL_OUTOF10: 3
PAINLEVEL_OUTOF10: 0 - NO PAIN
PAIN_LEVEL: 2
PAINLEVEL_OUTOF10: 2
PAINLEVEL_OUTOF10: 4
PAINLEVEL_OUTOF10: 4
PAINLEVEL_OUTOF10: 0 - NO PAIN
PAINLEVEL_OUTOF10: 2
PAINLEVEL_OUTOF10: 0 - NO PAIN

## 2024-03-21 ASSESSMENT — LIFESTYLE VARIABLES
AUDIT-C TOTAL SCORE: -1
HOW OFTEN DO YOU HAVE A DRINK CONTAINING ALCOHOL: PATIENT DECLINED
AUDIT-C TOTAL SCORE: -1
HOW MANY STANDARD DRINKS CONTAINING ALCOHOL DO YOU HAVE ON A TYPICAL DAY: PATIENT DECLINED
HOW OFTEN DO YOU HAVE 6 OR MORE DRINKS ON ONE OCCASION: PATIENT DECLINED
SKIP TO QUESTIONS 9-10: 0

## 2024-03-21 ASSESSMENT — PAIN - FUNCTIONAL ASSESSMENT
PAIN_FUNCTIONAL_ASSESSMENT: 0-10

## 2024-03-21 ASSESSMENT — COLUMBIA-SUICIDE SEVERITY RATING SCALE - C-SSRS
2. HAVE YOU ACTUALLY HAD ANY THOUGHTS OF KILLING YOURSELF?: NO
6. HAVE YOU EVER DONE ANYTHING, STARTED TO DO ANYTHING, OR PREPARED TO DO ANYTHING TO END YOUR LIFE?: NO
1. IN THE PAST MONTH, HAVE YOU WISHED YOU WERE DEAD OR WISHED YOU COULD GO TO SLEEP AND NOT WAKE UP?: NO

## 2024-03-21 ASSESSMENT — PAIN DESCRIPTION - LOCATION
LOCATION: MOUTH
LOCATION: MOUTH

## 2024-03-21 ASSESSMENT — PATIENT HEALTH QUESTIONNAIRE - PHQ9
2. FEELING DOWN, DEPRESSED OR HOPELESS: NOT AT ALL
1. LITTLE INTEREST OR PLEASURE IN DOING THINGS: NOT AT ALL
SUM OF ALL RESPONSES TO PHQ9 QUESTIONS 1 & 2: 0

## 2024-03-21 ASSESSMENT — PAIN DESCRIPTION - ORIENTATION: ORIENTATION: INNER

## 2024-03-21 NOTE — ANESTHESIA PREPROCEDURE EVALUATION
Patient: Chris Zabala    Procedure Information       Anesthesia Start Date/Time: 03/21/24 0722    Procedure: Excision Bone Cyst Maxilla    Location: [facility] OR 06 / Virtual [facility] [place]    Surgeons: Jefferson Weeks DDS            Relevant Problems   No relevant active problems       Clinical information reviewed:   Tobacco  Allergies  Meds   Med Hx  Surg Hx   Fam Hx  Soc Hx        NPO Detail:  NPO/Void Status  Date of Last Liquid: 03/21/24  Time of Last Liquid: 0000  Date of Last Solid: 03/21/24  Time of Last Solid: 0000         Physical Exam    Airway  Mallampati: II     Cardiovascular - normal exam     Dental - normal exam     Pulmonary - normal exam     Abdominal            Anesthesia Plan    History of general anesthesia?: yes  History of complications of general anesthesia?: no    ASA 2     general     intravenous induction   Anesthetic plan and risks discussed with patient.    Plan discussed with CAA and attending.

## 2024-03-21 NOTE — OP NOTE
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]     Procedures  Excision Bone Cyst Maxilla  90267 - PA EXC BENIGN TUMOR/CYST MAXL INTRA-ORAL OSTEOT    Advancement of buccal fat flap for antrostomy closure    Surgeons      * Jefferson Weeks - Primary    Resident/Fellow/Other Assistant:  Surgeon(s) and Role:     * Pollo Lomax DDS - Resident - Assisting     * Marcial Bledsoe DMD - Resident - Assisting    Procedure Summary  Anesthesia: * No anesthesia type entered *  ASA: II  Anesthesia Staff: Anesthesiologist: Babatunde Villar MD  C-AA: WILMA Cuenca; WILMA Herrera  MARIELLE: Silvia Palmer  Estimated Blood Loss: 20mL  Intra-op Medications:   Administrations occurring from 0715 to 1030 on 24:   Medication Name Total Dose   sodium chloride 0.9 % irrigation solution 1,000 mL   sodium chloride 0.9% infusion Cannot be calculated   lidocaine-epinephrine (Xylocaine W/EPI) 1 %-1:100,000 injection 14 mL   bacitracin ointment 1 Application   lactated Ringer's infusion Cannot be calculated              Anesthesia Record               Intraprocedure I/O Totals          Intake    LR infusion 850.00 mL    Total Intake 850 mL       Output    Est. Blood Loss 20 mL    Total Output 20 mL       Net    Net Volume 830 mL          Specimen:   ID Type Source Tests Collected by Time   1 : RIGHT MAXILLARY TUMOR Tissue MAXILLA RIGHT ANTERIOR RESECTION SURGICAL PATHOLOGY EXAM Jefferson Weeks DDS 3/21/2024 0809   2 : PERFORATION SITE Tissue MAXILLA RIGHT ANTERIOR BIOPSY SURGICAL PATHOLOGY EXAM Jefferson Weeks DDS 3/21/2024 0828   3 : ANTERIOR PERFORATION SITE Tissue MAXILLA RIGHT ANTERIOR BIOPSY SURGICAL PATHOLOGY EXAM Jefferson Weeks DDS 3/21/2024 0829   4 : POSTERIOR  PERFORATION SITE Tissue MAXILLA RIGHT ANTERIOR BIOPSY SURGICAL PATHOLOGY EXAM Jefferson Weeks, OCHOAS 3/21/2024 0833   5 : RIGHT NASAL MUCOSA Tissue NASAL MASS RIGHT BIOPSY SURGICAL PATHOLOGY EXAM Jefferson Weeks, DDS 3/21/2024 0846   6 : POSTERIOR LATERAL Tissue MAXILLA RIGHT ANTERIOR BIOPSY SURGICAL PATHOLOGY EXAM Jefferson Weeks DDS 3/21/2024 0905   7 : HARD TUBEROSITY AREA Tissue MAXILLA RIGHT ANTERIOR BIOPSY SURGICAL PATHOLOGY EXAM Jefferson Weeks DDS 3/21/2024 0907   A : RIGHT MAXILLARY SINUS Swab MAXILLA RIGHT ANTERIOR BIOPSY FUNGAL CULTURE/SMEAR, TISSUE/WOUND CULTURE/SMEAR Jefferson Weeks, DDS 3/21/2024 0854        Staff:   Circulator: Jenny Moncada RN  Scrub Person: Shelly Mejia         Drains and/or Catheters: * None in log *    Tourniquet Times: NA        Implants: NA    Findings: 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     Indications: Chris Zabala is an 69 y.o. 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 He had a biopsy performed at an outside facility that resulted as ameloblastoma. Patient presents today for excision of maxillary ameloblastoma.     The patient was seen in the preoperative area. The risks, benefits, complications, treatment options, non-operative alternatives, expected recovery and outcomes were discussed with the patient. The possibilities of reaction to medication, pulmonary aspiration, injury to surrounding structures, bleeding, recurrent infection, the need for additional procedures, failure to diagnose a condition, and creating a complication requiring transfusion or operation were discussed with the patient. Patient was advised of aggressive nature of ameloblastoma and risk of recurrence even after aggressive removal. Pt was advised that several teeth would be removed. He was advised of risk for possible permanent nerve damage and of the likely need for future procedures for reconstruction.   "The patient concurred with the proposed plan, giving informed consent.  The site of surgery was properly noted/marked if necessary per policy. The patient has been actively warmed in preoperative area. Preoperative antibiotics have been ordered and given within 1 hours of incision. Venous thrombosis prophylaxis are not indicated.    Procedure Details: The patient was greeted in the pre-op holding area, where all preoperative risks and complications were reviewed. Later, the patient was brought into the operating room by the anesthesia staff and was placed in the supine position. A \"Time out\" was performed to confirmed patient's identity and the procedure to be performed. The patient was then induced for general anesthesia and intubated with a oral endotracheal tube. The patient was prepped and draped in the standard oral and maxillofacial surgery fashion. A throat pack was then placed.     A pre-incision pause was performed to confirm patient identity and procedure/laterality to be performed. 1% lidocaine with 1:100k epinephrine was administered via right  CN V2 block and local infiltration. A bovie was used to outline the right maxillary dentition from second molar to canine leaving approximately 2 mm cuff of tissue around the teeth to be left with specimen. Tooth #6 extracted with forceps. #9 periosteal elevator used to dissect from this incision margin buccally and palatally. Dissection carried out in a subperiosteal plane anteriorly where based on CT scan there was no cortical perforation. Near the site of cortical perforation on the palate and buccal cortex posteriorly, the dissection was then carried supraperiosteal. This was performed with curved iris scissors. A sonopet saw was then used to make a cortical window in to the sinus above the premolar area. This allowed for direct visualization and extent of the tumor in the sinus. With direct visualization, the sonopet was then used to cut around the tumor on " the buccal cortex, anteriorly through the canine socket and medially along the palate. The osteotomies were carried posteriorly past the tuberosity and up until the pterygoid plate. Within the sinus, the tumor was noted to extend to the lateral nasal wall. Sonopet used to outline this portion of the nasal wall to include with specimen. With the section of maxilla containing the tumor appropriately outlined, osteotome and kocher used to separate the specimen with bovie to sharply dissect any soft tissue attachments. The specimen was freed and clinically had 1 cm margins and/or one tissue barrier as margins. Aquamantis used to cauterize any bleeding near the sites of descending palating artery  and any other bleeding. Hemostasis was achieved. Several samples of tissue near the site of perforation at the palate, site of perforation at the buccal cortex, posteriorly near the questionable perforation at the pterygoid plate/tuberosity area, as well as tissue within the nasal cavity at the site of perforation of the lateral nasal wall. All of these samples were sent for frozens and all returned negative for tumor. Sharp bony edges smoothed with rongeur and bone file. The site was irrigated with copious sterile water. Water allowed to soak area for 3 minutes. Nugauze soaked in bacitracin placed into maxillary sinus to fill up any dead space. The tale was extended through perforation into nasal cavity and out right naris to allow for removal at follow up. The tail was sutured to the nasal septum with 3-0 silk suture. Curved herrera scissors used to bluntly dissect into buccal fat pad, which was easily visible through wound. Buccal fat was easily teased out using gentle pressure. 3-0 vicryl suture used to pull fat into sinus to cover site and secured in place to deep surface of palatal mucosa. Subperiosteal release performed at buccal flap with #15 blade and curved iris scissors to allow for tension free closure. Closure of the  maxillary site performed with 3-0 vicryl suture in horizontal mattress fashion and running continuous fashion for water tight closure. Closure was tension free and water tight.       The throat pack was removed. The oral cavity was suctioned.     Care of the patient was then turned over to the anesthesia team. The patient was emerged from anesthesia, extubated and was taken to PACU in stable condition.       Dr. Weeks was present for all critical portions of the case.    Complications:  None; patient tolerated the procedure well.    Disposition: PACU - hemodynamically stable.  Condition: stable         Additional Details: NA    Attending Attestation:     Jefferson Weeks  Phone Number: [phone]

## 2024-03-21 NOTE — PERIOPERATIVE NURSING NOTE
1015 Pt arrived to PACU, alert and able to answer questions and follow commands. Pt denies pain and discomfort at this time, no distress noted. Will continue to monitor. '    1215 Pt stable. No distress noted. Will continue to monitor.     1430 Pt noted to have some bleeding from stitches to upper palate in mouth. The area is well approximated. Pt denies nausea and denies pain. Oral surgery team paged to make aware, no new orders at this time. Will continue to monitor.    1514 Report called to nurse on [facility] tower 9. Transport requested for pt.      1536 pt in route to room with all personal belongings.       Meme Goins RN

## 2024-03-21 NOTE — ANESTHESIA PROCEDURE NOTES
Airway  Date/Time: 3/21/2024 7:30 AM  Urgency: elective    Airway not difficult    Staffing  Performed: MARIELLE   Authorized by: Babatunde Villar MD    Performed by: WILMA Cuenca  Patient location during procedure: OR    Indications and Patient Condition  Indications for airway management: anesthesia  Spontaneous Ventilation: absent  Sedation level: deep  Preoxygenated: yes  Patient position: sniffing  Mask difficulty assessment: 1 - vent by mask    Final Airway Details  Final airway type: endotracheal airway      Successful airway: ETT  Cuffed: yes   Successful intubation technique: direct laryngoscopy  Endotracheal tube insertion site: left naris  Blade: Walker  Blade size: #4  ETT size (mm): 7.5  Cormack-Lehane Classification: grade I - full view of glottis  Placement verified by: chest auscultation and capnometry   Measured from: nares  ETT to nares (cm): 27  Number of attempts at approach: 1    Additional Comments  LTA kit. Sutured to nare by surgical team.    Intubated by MARIELLE Edmondson-2.

## 2024-03-21 NOTE — CARE PLAN
The patient's goals for the shift include pain control.     The clinical goals for the shift include Patient will maintain safety by the end of this shift.    Over the shift, the patient did  make progress towards his goals and maintained safety throughout the shift. His pain was controlled with medications and ice packs.

## 2024-03-21 NOTE — H&P
History Of Present Illness  Chris Zabala is a 69 y.o. male presenting with biopsy proven ameloblastoma. The patient reported feeling pressure in his right gingiva and intermittent pain. The patient also reported reduced ability to smell.      Past Medical History  Past Medical History:   Diagnosis Date    Acute maxillary sinusitis, unspecified 03/21/2014    Acute maxillary sinusitis    Cellulitis of buttock 07/29/2015    Cellulitis of buttock    Ganglion, unspecified site 07/31/2015    Ganglion    Personal history of other diseases of the respiratory system 11/11/2015    History of acute sinusitis    Personal history of other diseases of the respiratory system     History of asthma    Personal history of other diseases of urinary system 07/14/2015    History of hematuria    Personal history of other specified conditions 07/27/2015    History of epistaxis       Surgical History  Past Surgical History:   Procedure Laterality Date    COLONOSCOPY  07/14/2015    Complete Colonoscopy    HERNIA REPAIR  07/14/2015    Hernia Repair    TONSILLECTOMY  07/27/2015    Tonsillectomy        Social History  He reports that he has never smoked. He has never used smokeless tobacco. He reports current alcohol use. No history on file for drug use.    Family History  No family history on file.     Allergies  Cefaclor, Clarithromycin, House dust mite, Methocarbamol, and Penicillin    Review of Systems   All other systems reviewed and are negative.  Reported feeling pressure in his right gingiva and intermittent pain.     Physical Exam  HENT:      Head: Normocephalic.      Nose: Nose normal.      Mouth/Throat:      Mouth: Mucous membranes are moist.      Pharynx: Oropharynx is clear.      Comments: Stable and reproducible occlusion. Maximal incisal opening ~40mm.  Paryngeal walls without massess or lesions. Tonsils without erythema. Uvula symmetric. Patient tolerating own secretions. Right posterior maxillary ridge expansion with no pain  "to palpation. Occlusion is stable and reproducible.           Eyes:      Extraocular Movements: Extraocular movements intact.      Pupils: Pupils are equal, round, and reactive to light.   Cardiovascular:      Rate and Rhythm: Normal rate and regular rhythm.   Pulmonary:      Effort: Pulmonary effort is normal.   Abdominal:      General: Abdomen is flat.      Palpations: Abdomen is soft.   Musculoskeletal:         General: Normal range of motion.      Cervical back: Normal range of motion.   Skin:     General: Skin is warm and dry.   Neurological:      Mental Status: He is alert.   Psychiatric:         Mood and Affect: Mood normal.         Behavior: Behavior normal.          Last Recorded Vitals  Blood pressure 139/79, pulse 83, temperature 36.1 °C (97 °F), temperature source Temporal, resp. rate 16, height 1.778 m (5' 10\"), weight 84.4 kg (186 lb 1.1 oz), SpO2 97 %.    Relevant Results         Assessment/Plan     Chris Zabala is a 69 y.o. male presenting with biopsy proven ameloblastoma. The patient is planned for segmental resection of the right maxilla, extraction of indicated teeth, and soft tissue advancement in the OR for his ameloblastoma.      Biju Pearson DMD  Oral and maxillofacial surgery intern   Pager [phone]   "

## 2024-03-21 NOTE — ANESTHESIA PROCEDURE NOTES
Peripheral IV  Date/Time: 3/21/2024 7:36 AM  Inserted by: Silvia Palmer    Placement  Needle size: 16 G  Laterality: right  Location: hand  Site prep: alcohol  Attempts: 1

## 2024-03-21 NOTE — NURSING NOTE
Patient arrives to the unit, stable, with belongings. Charge nurse Erika GUERRERO Obtained report from PACU.

## 2024-03-21 NOTE — ANESTHESIA POSTPROCEDURE EVALUATION
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])    Surgeons: Jefferson Weeks DDS Responsible Provider: Babatunde Villar MD    Anesthesia Type: general ASA Status: 2            Anesthesia Type: general    Vitals Value Taken Time   /79 03/21/24 1028   Temp 36.1 03/21/24 1028   Pulse 83 03/21/24 1028   Resp 16 03/21/24 1028   SpO2 97 03/21/24 1028       Anesthesia Post Evaluation    Patient participation: complete - patient participated  Level of consciousness: awake and alert  Pain score: 2  Pain management: adequate  Airway patency: patent  Cardiovascular status: acceptable  Respiratory status: acceptable  Hydration status: acceptable  Postoperative Nausea and Vomiting: none        No notable events documented.

## 2024-03-22 ENCOUNTER — PHARMACY VISIT (OUTPATIENT)
Dept: PHARMACY | Facility: CLINIC | Age: 69
End: 2024-03-22
Payer: COMMERCIAL

## 2024-03-22 VITALS
WEIGHT: 186.07 LBS | BODY MASS INDEX: 26.64 KG/M2 | HEART RATE: 96 BPM | SYSTOLIC BLOOD PRESSURE: 111 MMHG | TEMPERATURE: 98.2 F | HEIGHT: 70 IN | OXYGEN SATURATION: 94 % | DIASTOLIC BLOOD PRESSURE: 56 MMHG | RESPIRATION RATE: 16 BRPM

## 2024-03-22 PROCEDURE — RXMED WILLOW AMBULATORY MEDICATION CHARGE

## 2024-03-22 PROCEDURE — 2500000004 HC RX 250 GENERAL PHARMACY W/ HCPCS (ALT 636 FOR OP/ED): Performed by: DENTIST

## 2024-03-22 PROCEDURE — 2500000001 HC RX 250 WO HCPCS SELF ADMINISTERED DRUGS (ALT 637 FOR MEDICARE OP): Performed by: DENTIST

## 2024-03-22 PROCEDURE — 7100000011 HC EXTENDED STAY RECOVERY HOURLY - NURSING UNIT

## 2024-03-22 RX ORDER — IBUPROFEN 600 MG/1
600 TABLET ORAL EVERY 6 HOURS PRN
Qty: 28 TABLET | Refills: 0 | Status: SHIPPED | OUTPATIENT
Start: 2024-03-22 | End: 2024-03-29

## 2024-03-22 RX ORDER — LEVOFLOXACIN 500 MG/1
500 TABLET, FILM COATED ORAL
Status: DISCONTINUED | OUTPATIENT
Start: 2024-03-22 | End: 2024-03-22

## 2024-03-22 RX ORDER — LEVOFLOXACIN 500 MG/1
500 TABLET, FILM COATED ORAL
Qty: 7 TABLET | Refills: 0 | Status: SHIPPED | OUTPATIENT
Start: 2024-03-22 | End: 2024-03-29

## 2024-03-22 RX ORDER — LEVOFLOXACIN 500 MG/1
500 TABLET, FILM COATED ORAL
Status: CANCELLED | OUTPATIENT
Start: 2024-03-22

## 2024-03-22 RX ORDER — POLYETHYLENE GLYCOL 3350 17 G/17G
17 POWDER, FOR SOLUTION ORAL DAILY
Qty: 3 PACKET | Refills: 0 | Status: SHIPPED | OUTPATIENT
Start: 2024-03-22 | End: 2024-03-25

## 2024-03-22 RX ORDER — OXYCODONE HYDROCHLORIDE 5 MG/1
5 TABLET ORAL EVERY 6 HOURS PRN
Qty: 12 TABLET | Refills: 0 | Status: SHIPPED | OUTPATIENT
Start: 2024-03-22 | End: 2024-03-25

## 2024-03-22 RX ORDER — CHLORHEXIDINE GLUCONATE ORAL RINSE 1.2 MG/ML
15 SOLUTION DENTAL 3 TIMES DAILY
Qty: 630 ML | Refills: 0 | Status: SHIPPED | OUTPATIENT
Start: 2024-03-22 | End: 2024-04-05

## 2024-03-22 RX ORDER — ONDANSETRON 4 MG/1
8 TABLET, FILM COATED ORAL EVERY 8 HOURS PRN
Qty: 20 TABLET | Refills: 0 | Status: SHIPPED | OUTPATIENT
Start: 2024-03-22 | End: 2024-03-26

## 2024-03-22 RX ORDER — ACETAMINOPHEN 500 MG
500 TABLET ORAL EVERY 6 HOURS PRN
Qty: 28 TABLET | Refills: 0 | Status: SHIPPED | OUTPATIENT
Start: 2024-03-22 | End: 2024-03-29

## 2024-03-22 RX ORDER — OXYMETAZOLINE HCL 0.05 %
2 SPRAY, NON-AEROSOL (ML) NASAL EVERY 12 HOURS PRN
Qty: 30 ML | Refills: 0 | Status: SHIPPED | OUTPATIENT
Start: 2024-03-22

## 2024-03-22 RX ADMIN — LEVOFLOXACIN 500 MG: 500 TABLET, FILM COATED ORAL at 11:10

## 2024-03-22 RX ADMIN — CHLORHEXIDINE GLUCONATE 15 ML: 1.2 SOLUTION ORAL at 08:18

## 2024-03-22 RX ADMIN — DEXAMETHASONE SODIUM PHOSPHATE 8 MG: 10 INJECTION INTRAMUSCULAR; INTRAVENOUS at 08:19

## 2024-03-22 RX ADMIN — ACETAMINOPHEN 650 MG: 650 SOLUTION ORAL at 01:11

## 2024-03-22 RX ADMIN — IBUPROFEN 600 MG: 200 SUSPENSION ORAL at 08:19

## 2024-03-22 RX ADMIN — ACETAMINOPHEN 650 MG: 650 SOLUTION ORAL at 11:10

## 2024-03-22 RX ADMIN — DEXAMETHASONE SODIUM PHOSPHATE 8 MG: 10 INJECTION INTRAMUSCULAR; INTRAVENOUS at 01:11

## 2024-03-22 ASSESSMENT — PAIN SCALES - GENERAL
PAINLEVEL_OUTOF10: 2
PAINLEVEL_OUTOF10: 3
PAINLEVEL_OUTOF10: 2

## 2024-03-22 ASSESSMENT — PAIN - FUNCTIONAL ASSESSMENT
PAIN_FUNCTIONAL_ASSESSMENT: 0-10
PAIN_FUNCTIONAL_ASSESSMENT: 0-10

## 2024-03-22 NOTE — DISCHARGE SUMMARY
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 The patient was doing well with minor bleeding intraorally.   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  The patient reported that he is doing well at the moment, pain is well controlled. The patient was able to ambulate and urinate . The patient was able to tolerate oral intake.     Pertinent Physical Exam At Time of Discharge  Physical Exam  Constitutional:       Appearance: Normal appearance.   HENT:      Head: Normocephalic.      Right Ear: Tympanic membrane normal.      Left Ear: Tympanic membrane normal.      Nose:      Comments: Dried blood bilaterally. The patient complain of mild congestion on the right side. Right sinus packing visible and intact.      Mouth/Throat:      Comments:   ORAL CAVITY/OROPHARYNX/LIPS:  Normal and moist mucous membranes, normal floor of mouth/tongue/oropharnx, no masses or lesions. No fractures or avulsion of teeth. Stable and reproducible occlusion. No TMJ clicking or popping. Maximal incisal opening ~45mm.  Paryngeal walls without massess or lesions. Tonsils without erythema. Incision intact and hemostasis.       Eyes:      Extraocular Movements: Extraocular movements intact.      Pupils: Pupils are equal, round, and reactive to light.   Cardiovascular:      Rate and Rhythm: Normal rate and regular rhythm.   Pulmonary:      Effort: Pulmonary effort is normal.   Abdominal:      General: Abdomen is flat.      Palpations: Abdomen is soft.   Musculoskeletal:         General:  Normal range of motion.      Cervical back: Normal range of motion and neck supple.   Skin:     General: Skin is warm and dry.   Neurological:      General: No focal deficit present.      Mental Status: He is alert.      Comments: Awake and alert. Oriented to person, place, and time.  Cranial nerves II-XII grossly intact and symmetric bilaterally. No anesthesia or paresthesia of CN V bilaterally. CN VII without defects, patient showing full facial range of expression.     Psychiatric:         Mood and Affect: Mood normal.         Behavior: Behavior normal.     Home Medications     Medication List      START taking these medications     acetaminophen 500 mg tablet; Commonly known as: Tylenol; Take 1 tablet   (500 mg) by mouth every 6 hours if needed for mild pain (1 - 3) for up to   7 days.   chlorhexidine 0.12 % solution; Commonly known as: Peridex; Use 15 mL in   the mouth or throat 3 times a day for 14 days.   ibuprofen 600 mg tablet; Take 1 tablet (600 mg) by mouth every 6 hours   if needed for mild pain (1 - 3) for up to 7 days.   levoFLOXacin 500 mg tablet; Commonly known as: Levaquin; Take 1 tablet   (500 mg) by mouth once every 24 hours for 7 days.   ondansetron 4 mg tablet; Commonly known as: Zofran; Take 2 tablets (8   mg) by mouth every 8 hours if needed for nausea or vomiting for up to 3   days.   oxyCODONE 5 mg immediate release tablet; Commonly known as: Roxicodone;   Take 1 tablet (5 mg) by mouth every 6 hours if needed for severe pain (7 -   10) for up to 3 days.   oxymetazoline 0.05 % nasal spray; Commonly known as: Afrin; Administer 2   sprays into each nostril every 12 hours if needed for congestion. Do not   use for more than 3 days.   polyethylene glycol 17 gram packet; Commonly known as: Glycolax,   Miralax; Take 17 g by mouth once daily for 3 days.   sodium chloride 0.65 % nasal spray; Commonly known as: Ocean; Administer   1 spray into each nostril if needed for congestion.     CONTINUE taking  these medications     sildenafil 100 mg tablet; Commonly known as: Viagra; Take 1 tablet (100   mg) by mouth once daily.   valACYclovir 500 mg tablet; Commonly known as: Valtrex; Take 1 tablet   (500 mg) by mouth 2 times a day.       Outpatient Follow-Up  Pt to return to [facility] outpatient clinic on 3/29/24 for follow-up     Biju Pearson DMD  Oral and maxillofacial intern   Pager [phone]

## 2024-03-22 NOTE — NURSING NOTE
Patient was discharged home without homecare. RN removed patient's peripheral IV prior to discharge. RN went over discharge instruction with patient. Patient did not have any questions/comments regarding discharge instructions. Patient refused transport. Patient left via independent with his belongings.

## 2024-03-22 NOTE — PROGRESS NOTES
Chris Zabala is a 69 y.o. 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      Subjective   The patient reported that he is doing well at the moment, pain is well controlled. The patient was able to ambulate and urinate last night. The patient was able to tolerate oral intake.     Objective     Physical Exam  Constitutional:       Appearance: Normal appearance.   HENT:      Head: Normocephalic.      Right Ear: Tympanic membrane normal.      Left Ear: Tympanic membrane normal.      Nose:      Comments: Dried blood bilaterally. The patient complain of mild congestion on the right side. Right sinus packing visible and intact.      Mouth/Throat:      Comments:   ORAL CAVITY/OROPHARYNX/LIPS:  Normal and moist mucous membranes, normal floor of mouth/tongue/oropharnx, no masses or lesions. No fractures or avulsion of teeth. Stable and reproducible occlusion. No TMJ clicking or popping. Maximal incisal opening ~45mm.  Paryngeal walls without massess or lesions. Tonsils without erythema. Incision intact and hemostasis.         Eyes:      Extraocular Movements: Extraocular movements intact.      Pupils: Pupils are equal, round, and reactive to light.   Cardiovascular:      Rate and Rhythm: Normal rate and regular rhythm.   Pulmonary:      Effort: Pulmonary effort is normal.   Abdominal:      General: Abdomen is flat.      Palpations: Abdomen is soft.   Musculoskeletal:         General: Normal range of motion.      Cervical back: Normal range of motion and neck supple.   Skin:     General: Skin is warm and dry.   Neurological:      General: No focal deficit present.      Mental Status: He is alert.      Comments: Awake and alert. Oriented to person, place, and time.  Cranial nerves II-XII grossly intact and symmetric bilaterally. No anesthesia or paresthesia of CN V bilaterally. CN VII without defects, patient showing full facial range of expression.    "  Psychiatric:         Mood and Affect: Mood normal.         Behavior: Behavior normal.         Last Recorded Vitals  Blood pressure 112/76, pulse 96, temperature 36.6 °C (97.9 °F), temperature source Temporal, resp. rate 16, height 1.778 m (5' 10\"), weight 84.4 kg (186 lb 1.1 oz), SpO2 95 %.  Intake/Output last 3 Shifts:  I/O last 3 completed shifts:  In: 2220 (26.3 mL/kg) [I.V.:2220 (26.3 mL/kg)]  Out: 290 (3.4 mL/kg) [Urine:270 (0.1 mL/kg/hr); Blood:20]  Weight: 84.4 kg     Relevant Results     Renal function panel          Component  Ref Range & Units 1 d ago 4 mo ago 1 yr ago   Glucose  74 - 99 mg/dL 135 High  94 98   Sodium  136 - 145 mmol/L 135 Low  140 140   Potassium  3.5 - 5.3 mmol/L 4.5 5.0 4.8   Chloride  98 - 107 mmol/L 103 104 104   Bicarbonate  21 - 32 mmol/L 25 28 31   Anion Gap  10 - 20 mmol/L 12 13 10   Urea Nitrogen  6 - 23 mg/dL 22 18 14   Creatinine  0.50 - 1.30 mg/dL 0.96 0.98 0.87   eGFR  >60 mL/min/1.73m*2 86 85 CM    Comment: Calculations of estimated GFR are performed using the 2021 CKD-EPI Study Refit equation without the race variable for the IDMS-Traceable creatinine methods.  https://jasn.asnjournals.org/content/early/2021/09/22/ASN.5241460937   Calcium  8.6 - 10.6 mg/dL 9.2 9.7 9.8   Phosphorus  2.5 - 4.9 mg/dL 3.3     Comment: The performance characteristics of phosphorus testing in heparinized plasma have been validated by the individual  laboratory site where testing is performed. Testing on heparinized plasma is not approved by the FDA; however, such approval is not necessary.   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 male on day 1 of admission, post-op day 1 for resection of " right maxilla due to previously biopsy proven ameloblastoma.  The patient will following appropriate healing course.     Feed/Diet - full liquid diet     Analgesia/sedation/anxiolytic - Scheduled Acetaminophen and Ibuprofen. Moderate Pain: Oxycodone 5mg q4h, Severe Pain: Oxycodone 10mg q4h.    Volume - discontinue iv fluid     OMFS - Yankaur suction bedside. Peridex 0.12% (Chlorhexidine) TID rinse and spit after meals,     Respiratory - Ocean nasal spray q2hrs. Afrin 2 snorts in each nostril prn epistaxis, repeat as needed. Incentive spirometry. Page if SpO2 <92%)    Infectious disease - Levaquin 500mg q24h     Inflammation - 8mg Decadron q8h x3 doses, discontinue after third dose.     Glycemic control - Sliding scale Lispro    Genitourinary - no interventions indicated    The patient is planned for discharge today afternoon.     Biju Pearson DMD  Oral and maxillofacial surgery intern   Pager [phone]

## 2024-03-22 NOTE — CARE PLAN
The patient's goals for the shift include      The clinical goals for the shift include Patient will maintain safety by the end of this shift.    Over the shift, the patient did not make progress toward the following goals. Barriers to progression include pain. Recommendations to address these barriers include medicate.

## 2024-03-22 NOTE — PROGRESS NOTES
Discharge planning note:      Chris Zabala is a 69 y.o. 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      Order placed for discharge home with  no needs.        Brittni Carreno RN [facility]

## 2024-03-22 NOTE — DISCHARGE INSTRUCTIONS
MEDICATIONS  ° Pain medication should be taken only during the time that you feel significant discomfort. If the pain is severe, the prescription medication can be used. However, if only mild discomfort is experienced, try to use a less potent, over the counter medication such as Advil (ibuprofen and/or Tylenol (Acetaminophen). These can be taken in crushed tablets or in liquid form.  ° Antibiotics: This medicine should be taken at the appropriate interval as described on the bottle. Be sure not to miss any doses and take the medicine until it is gone.  ° Nasal spray (Afrin, Neosynephrine or equivalent) can be used at six to eight hour intervals to help improve breathing through your nose. In order to prevent overuse of the nasal spray, this can be alternated with plain saline (salt water or ocean spray) nasal spray. These can be purchased from your pharmacy.  ° Lip ointment:  Keep enough ointment, such as Vaseline, on the lips to keep them looking wet.    SINUS PRECAUTIONS  ° Due to the upper jaw surgery your sinuses may be filled with blood. It is normal for small amount of  blood clots to come out of your nose. However profuse bright red blood may be of concern and please page the resident on call.  ° Do not blow your nose for 2 weeks, you may wipe your nose.   ° Do not sneeze with mouth closed (have lips/mouth open while sneezing).  ° Do not drink through a straw or smoke.    DRINKING  ° Keep hydrated by drinking at least 8-9 glasses of liquids a day. This is extremely important to prevent dehydration. Signs of dehydration are: dry mouth, dark yellow urine in small amounts, and dizziness with change in position or increased feeling of weakness/tiredness.  ° Do not use a straw for the first day or two since this can create more bleeding and may be difficult due to numbness in your lips.  ° Attempt to drink from a cup as soon as possible. While some fluid may spill when drinking, a cup is still the most effective  way for taking fluids. The use of a ``sippy cup´´ like those used by toddlers may be helpful for the first few days.  ° Place a towel under your chin and pour a small amount of liquid into the cup. Tip your head back slightly and attempt to open your mouth a tiny bit while pouring the fluid into your mouth. Pour slowly and take in a small amount of fluid. Take the cup away from your mouth. If necessary use slight finger pressure to place your lips together and attempt to swallow. This will be difficult at first, but you will find that it will become much easier in a day or two.  ° If drinking from a cup seems to be impossible, continue using the syringe with the tubing attached. Another alternative is to obtain a squeeze bottle to squirt fluid into your mouth.  ° Do not drink raw milk products for two days after surgery. Raw milk adheres the incision areas and may promote infections. Processed milk products such as canned milk based drinks or cooked milk as in creamed soups are fine.    DIET  ° For the first 7 days after surgery: Liquid diet.  ° While this can include soups, and food with baby food consistency, this type of diet does not necessarily mean foods that are of liquid consistency.  ° You can also eat foods such as mashed potatoes, applesauce, oatmeal and pudding. It may be difficult to open your mouth wide enough to get a spoon inside.  A small baby sized spoon may be helpful.  ° Some sort of diet supplement such as Boost, Ensure, or similar substitutes may be used to increase calorie intake.  ° Your diet will be slowly advanced accordingly during follow up.     WOUND CARE  ° We recommend using ice packs on your face for the first 48 hours to reduce swelling and bruising over the affected areas. We recommend 20 minutes on and 20 minutes off. Make sure to wrap the ice pack in a towel - do not apply directly to skin.  Cold or heat directly on numb areas can lead to permanent damage of the skin.  ° Keep your  head elevated, at least 30 degrees, to minimize swelling. This may require you to sleep in a reclining chair or using several pillows in bed.    HYGIENE  ° It is extremely important for you to keep all areas inside your mouth clean after surgery. You should brush your teeth and rinse your mouth after eating.  Please brush very gently on the upper front teeth because of the sutures.  ° Rinse your mouth thoroughly with warm salt water or with provided chlorhexidine mouth wash.   ° THE IMPORTANCE OF CLEANING YOUR TEETH CANNOT BE OVEREMPHASIZED. THIS MUST BE DONE THOROUGHLY, SEVERAL TIMES EACH DAY.  ° Using chlorhexidine (Peridex) every day may cause discoloration of teeth. However, this can be removed by your dentist through a routine cleaning. To avoid discoloration, use mouthwash at most 4-5 days per week.    PHYSICAL ACTIVITY  ° Following surgery you will find that your energy level is much lower. This will take some time to return to normal.  ° Although you just had surgery, it is important that you do not stay in bed while awake to minimize the chances of leg clots. We highly encourage occasional standing and walking as tolerated.  ° Try to transfer to a chair instead and walk throughout the house as much as you can tolerate. Try to be as active as possible. The swelling will worsen over the next 3-4 days after surgery and is expected. The swelling will begin to subside over the next few days.  ° Physical exercise such as walking or running can begin 2 or 3 weeks after surgery.  ° When you attempt to return to normal physical activity start slowly and work up to your normal level.  ° It is important that you take deep breaths after surgery to help prevent development of pneumonia. Try to take 10-20 deep breaths every hour while you are awake. Encourage yourself to gently cough if you feel mucous accumulating in the back of your throat or lungs.     HEALING  ° You jaw bones have small bone screws and plates which  are used to hold the bones together during the healing period.  ° Like any broken bone it usually takes ~6 weeks until the bones heal.  ° Numbness and changes in sensation in areas of the face is extremely common. This typically resolves with time and should not be an immediate concern, however, we encourage you to discuss changes at your follow-up appointment.    PLEASE REPORT ANY OF THE FOLLOWING TO OUR TEAM:  ° Sudden or excessive bleeding, swelling, or bruising.  ° Any itching, rash, or adverse reaction to medication.  ° Fever over 100 degrees Fahrenheit.  ° Drainage or discharge from the incision sites (other than blood).  ° Any injury to the face over the next 6 weeks.    If you have any questions or concerns please contact us during regular office hours ([phone]).  For any emergency or after hours questions do not hesitate to contact the resident on call. You may call [phone] for the hospital  and ask for the ``Oral Surgeon On Call´´.

## 2024-03-22 NOTE — CARE PLAN
The patient's goals for the shift include being discharged.     The clinical goals for the shift include patient will remain HDS throughout this shift.    Over the shift, the patient did make progress towards his goals, remained stable and was discharged home.

## 2024-03-23 LAB
BACTERIA SPEC CULT: NORMAL
GRAM STN SPEC: NORMAL
GRAM STN SPEC: NORMAL

## 2024-03-29 ENCOUNTER — APPOINTMENT (OUTPATIENT)
Dept: RADIOLOGY | Facility: CLINIC | Age: 69
End: 2024-03-29
Payer: MEDICARE

## 2024-04-08 LAB
FUNGUS SPEC CULT: NORMAL
FUNGUS SPEC FUNGUS STN: NORMAL

## 2024-04-12 LAB
LABORATORY COMMENT REPORT: NORMAL
Lab: NORMAL
PATH REPORT.FINAL DX SPEC: NORMAL
PATH REPORT.GROSS SPEC: NORMAL
PATH REPORT.RELEVANT HX SPEC: NORMAL
PATH REPORT.TOTAL CANCER: NORMAL

## 2024-04-30 ENCOUNTER — OFFICE VISIT (OUTPATIENT)
Dept: OTOLARYNGOLOGY | Facility: HOSPITAL | Age: 69
End: 2024-04-30
Payer: MEDICARE

## 2024-04-30 VITALS — TEMPERATURE: 97.2 F | BODY MASS INDEX: 24.34 KG/M2 | WEIGHT: 170 LBS | HEIGHT: 70 IN

## 2024-04-30 DIAGNOSIS — C41.1: Primary | ICD-10-CM

## 2024-04-30 PROCEDURE — 1159F MED LIST DOCD IN RCRD: CPT | Performed by: STUDENT IN AN ORGANIZED HEALTH CARE EDUCATION/TRAINING PROGRAM

## 2024-04-30 PROCEDURE — 1160F RVW MEDS BY RX/DR IN RCRD: CPT | Performed by: STUDENT IN AN ORGANIZED HEALTH CARE EDUCATION/TRAINING PROGRAM

## 2024-04-30 PROCEDURE — 99204 OFFICE O/P NEW MOD 45 MIN: CPT | Performed by: STUDENT IN AN ORGANIZED HEALTH CARE EDUCATION/TRAINING PROGRAM

## 2024-04-30 PROCEDURE — 1036F TOBACCO NON-USER: CPT | Performed by: STUDENT IN AN ORGANIZED HEALTH CARE EDUCATION/TRAINING PROGRAM

## 2024-04-30 PROCEDURE — 99214 OFFICE O/P EST MOD 30 MIN: CPT | Performed by: STUDENT IN AN ORGANIZED HEALTH CARE EDUCATION/TRAINING PROGRAM

## 2024-04-30 RX ORDER — ERGOCALCIFEROL (VITAMIN D2) 200 MCG/ML
DROPS ORAL DAILY
COMMUNITY

## 2024-04-30 RX ORDER — IBUPROFEN 200 MG
TABLET ORAL EVERY 6 HOURS PRN
COMMUNITY

## 2024-05-01 NOTE — TUMOR BOARD NOTE
OTOLARYNGOLOGY - HEAD AND NECK SURGERY MULTIDISCIPLINARY TUMOR BOARD NOTE    Chris Zabala is a 69 y.o. male who was presented at the multidisciplinary Head and Neck Tumor Board on 5/3/24.    Review of history:  This is a 69 year old male who initially presented to oral surgeon, Dr. Cifuentes, in 2023 and underwent maxillectomy with tooth extraction and buccal fat flap in 2024 for a lesion of the right palate of about 2 years. 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 Macias for further management. 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     PMH/PSH  -  Past Medical History:   Diagnosis Date    Acute maxillary sinusitis, unspecified 2014    Acute maxillary sinusitis    Cellulitis of buttock 2015    Cellulitis of buttock    Ganglion, unspecified site 2015    Ganglion    Personal history of other diseases of the respiratory system 2015    History of acute sinusitis    Personal history of other diseases of the respiratory system     History of asthma    Personal history of other diseases of urinary system 2015    History of hematuria    Personal history of other specified conditions 2015    History of epistaxis         Social history:  Social History     Tobacco Use    Smoking status: Former     Current packs/day: 0.00     Types: Cigarettes     Quit date:      Years since quittin.3    Smokeless tobacco: Never   Substance Use Topics    Alcohol use: Yes     Comment: 3 drinks a week       Workup:  1. Imaging:  CT maxillofacial: IMPRESSION:  Postsurgical changes from right maxillary mass excision involving the  inferolateral, inferior, and medial maxillary walls as described  above. No definite evidence of residual mass though difficult to  evaluate due to extensive postsurgical changes.    2. Surgery: maxillectomy with tooth extraction and buccal fat flap    3. 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     Note: Tumor is noted involving maxillary bone and extending as a pedunculated mass into the maxillary sinus. While areas of conventional ameloblastoma are present, extensive mitotic activity, loss of central stellate reticulum like areas, loss of basilar polarity, and increase in cellularity consistent with malignant transformation are also present. Perineural and lymphovascular invasion are not noted. Carcinoma is present at  the posterior bony margin. Multiple separate specimen were received. Clinical correlation for margin status is advised.    Staging: [id number] ameloblastic carcinoma of the oral cavity     Although mass on initial imaging is 2.5 in largest dimension, T2?     Tumor board recommendations: Surgical resection, likely including revision maxillectomy and possible left neck dissection; radiation and chemotherapy have not proven superior     @CRC@    Note:  Current national standards and recommendations, as well as review of the current literature, were included in the discussions that led to the recommendations above.

## 2024-05-01 NOTE — PROGRESS NOTES
"ENT Outpatient Consultation    Chief Complaint:   History Of Present Illness  Chris Zabala is a 69 y.o. male presents for evaluation for ameloblastic carcinoma of the right maxilla.     The patient endorses a history of a right palate lesion for about 2 yrs. He initially presented to [facility] Oral surgery for evaluation and biopsy June 2023. This was consistent with ameloblastoma. There was delay in treatment and he ultimately presented to /[facility] Surgery March 2024 and underwent maxillectomy, tooth extraction, and buccal fat flap     Pathology resulted:      FINAL DIAGNOSIS   A. 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     Note: Tumor is noted involving maxillary bone and extending as a pedunculated mass into the maxillary sinus. While areas of conventional ameloblastoma are present, extensive mitotic activity, loss of central stellate reticulum like areas, loss of basilar polarity, and increase in cellularity consistent with malignant transformation are also present. Perineural and lymphovascular invasion are not noted. Carcinoma is present at  the posterior bony margin. Multiple separate specimen were received. Clinical correlation for margin status is advised.     B. Specimen designated \"Perforation site\", biopsy:  -- Salivary gland tissue and fibrovascular tissue, negative for tumor.     C. Specimen designated \"Anterior perforation site\", biopsy:  -- Fibroadipose tissue, negative for tumor.     D. Specimen designated \"Posterior perforation site\", biopsy:  -- Salivary gland tissue, negative for tumor.     E. Right nasal mucosa, biopsy:  --Respiratory mucosa, negative for tumor.     F. Specimen designated \"Posterior lateral\", biopsy:  --Cauterized fibrous and adipose tissue, negative for tumor.     G. Hard tuberosity area, biopsy:  -- Detached minute fragments of ameloblastoma, favor contaminant, admixed with cauterized skeletal muscle " and fibrous tissue.       Past Medical History  Past Medical History:   Diagnosis Date    Acute maxillary sinusitis, unspecified 03/21/2014    Acute maxillary sinusitis    Cellulitis of buttock 07/29/2015    Cellulitis of buttock    Ganglion, unspecified site 07/31/2015    Ganglion    Personal history of other diseases of the respiratory system 11/11/2015    History of acute sinusitis    Personal history of other diseases of the respiratory system     History of asthma    Personal history of other diseases of urinary system 07/14/2015    History of hematuria    Personal history of other specified conditions 07/27/2015    History of epistaxis         Surgical History  Past Surgical History:   Procedure Laterality Date    COLONOSCOPY  07/14/2015    Complete Colonoscopy    HERNIA REPAIR  07/14/2015    Hernia Repair    TONSILLECTOMY  07/27/2015    Tonsillectomy        Social History  He reports that he quit smoking about 49 years ago. His smoking use included cigarettes. He has never used smokeless tobacco. He reports current alcohol use. No history on file for drug use.    Family History  No family history on file.     Allergies  Cefaclor, Clarithromycin, House dust mite, and Methocarbamol     Physical Exam:  CONSTITUTIONAL:  No acute distress  VOICE:  No hoarseness or other abnormality  RESPIRATION:  Breathing comfortably, no stridor  CV:  No clubbing/cyanosis/edema in hands  EYES:  EOM intact, sclera normal  NEURO:  Alert and oriented times 3, Cranial nerves II-XII grossly intact and symmetric bilaterally  HEAD AND FACE:  Symmetric facial features, no masses or lesion  SALIVARY GLANDS:  Parotid and submandibular glands normal bilaterally  EARS:  Normal external ears  NOSE:  External nose midline  ORAL CAVITY/OROPHARYNX/LIPS: S/p maxillectomy with oral antral fistula, no mucosal lesions, no obvious mass, otherwise normal mucous membranes, normal floor of mouth/tongue/OP  PHARYNGEAL WALLS:  No masses or  "lesions  NECK/LYMPH:  No LAD, no thyroid masses, trachea midline  SKIN:  Neck skin is without scar or injury  PSYCH:  Alert and oriented with appropriate mood and affect     Last Recorded Vitals  Temperature 36.2 °C (97.2 °F), height 1.778 m (5' 10\"), weight 77.1 kg (170 lb).      CT maxillofacial 3/21/24   Personally reviewed and interpreted   Postoperative changes of right maxilla, packing material noted in defect/maxillary sinus  Noncontrasted scan limited in evaluation of cervical lymph nodes but no obvious enlarged nodes     Assessment and Plan  69 y.o. male s/p surgical resection (maxillectomy, buccal fat graft) for presumed ameloblastoma, final pathology with ameloblastic carcinoma, positive at posterior bony margin     Discussed likely need for revision surgery- maxillectomy, possible L neck dissection  Will order CT neck to evaluate cervical lymph nodes   Discussed reconstruction options including obturator vs free flap reconstruction for oral antral fistula  Will present at TB due to rare nature of this tumor and prior resection.    Delmy Macias MD    "

## 2024-05-01 NOTE — PATIENT INSTRUCTIONS
Dear Chris Zabala    Welcome to Dr. [facility],    Dr. Macias is a Head and neck oncologist and reconstructive surgeon. This means that she specializes in caring for patients with complex head and neck problems such as cancer, however,you may be seeing her for another reason.      Dr. Macias office number is [phone]. This number listed, is the most direct way to communicate with her office.      Dr. Macias's  answers the office phone from 8am-4pm Monday-Friday.  She can help you with many general questions and information.  Questions that she may not be able to answer will be directed to the appropriate staff.  You will need to leave a message and someone from the team will call you back.     Jing is Dr. Macias's primary nurse. She can be reached by calling the office as well.  Please be mindful that all non urgent calls will be returned within 24 hours of the call.     Sometimes, other team members will also be involved in your care.  These people may include dieticians, social workers, speech therapists, medical oncologists, or radiation oncologists.  Dr. Macias will provide these referrals for you as needed.      We look forward to working with you to meet your healthcare goals.

## 2024-05-03 ENCOUNTER — TUMOR BOARD CONFERENCE (OUTPATIENT)
Dept: HEMATOLOGY/ONCOLOGY | Facility: HOSPITAL | Age: 69
End: 2024-05-03
Payer: MEDICARE

## 2024-05-03 DIAGNOSIS — D16.4 BENIGN NEOPLASM OF MAXILLA: Primary | ICD-10-CM

## 2024-05-07 ENCOUNTER — TELEMEDICINE (OUTPATIENT)
Dept: OTOLARYNGOLOGY | Facility: HOSPITAL | Age: 69
End: 2024-05-07
Payer: MEDICARE

## 2024-05-07 DIAGNOSIS — C41.1: Primary | ICD-10-CM

## 2024-05-07 PROCEDURE — 99442 PR PHYS/QHP TELEPHONE EVALUATION 11-20 MIN: CPT | Performed by: STUDENT IN AN ORGANIZED HEALTH CARE EDUCATION/TRAINING PROGRAM

## 2024-05-07 PROCEDURE — 1160F RVW MEDS BY RX/DR IN RCRD: CPT | Performed by: STUDENT IN AN ORGANIZED HEALTH CARE EDUCATION/TRAINING PROGRAM

## 2024-05-07 PROCEDURE — 1159F MED LIST DOCD IN RCRD: CPT | Performed by: STUDENT IN AN ORGANIZED HEALTH CARE EDUCATION/TRAINING PROGRAM

## 2024-05-07 ASSESSMENT — PATIENT HEALTH QUESTIONNAIRE - PHQ9
SUM OF ALL RESPONSES TO PHQ9 QUESTIONS 1 & 2: 0
1. LITTLE INTEREST OR PLEASURE IN DOING THINGS: NOT AT ALL
2. FEELING DOWN, DEPRESSED OR HOPELESS: NOT AT ALL

## 2024-05-07 NOTE — PROGRESS NOTES
ENT Follow up     S:   Virtual visit today to discuss tumor board recommendations and next steps in treatment.  No concerns today   Continues to use dental prosthesis for closure of oral antral fistula   Denies pain   Denies new lesions or growths in the H&N    O:   Visit was conducted audio only so no physical exam was performed     A/P   69y with evidence of ameloblastic carcinoma, s/p maxillectomy with OMFS with positive margin and persistent oral antral fistula     Discussed  tumor board recommendations including surgical intervention including revision maxillectomy, +/- reconstructive with free tissue transfer from arm versus observation.  We discussed that this pathology is not very responsive to radiation and this would not be a good option   Patient does not wish to pursue surgical intervention at this time. I recommended the patient follow up at a short interval to re-evaluate .

## 2024-05-09 NOTE — TUMOR BOARD NOTE
OTOLARYNGOLOGY - HEAD AND NECK SURGERY MULTIDISCIPLINARY TUMOR BOARD NOTE     Chris Zabala is a 69 y.o. male who was presented at the multidisciplinary Head and Neck Tumor Board on 5/10/24.     Review of history:  This is a 69 year old male who initially presented to oral surgeon, Dr. Cifuentes, in 2023 and underwent maxillectomy with tooth extraction and buccal fat flap in 2024 for a lesion of the right palate of about 2 years. 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 Macias for further management. 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      PMH/PSH  -  Medical History        Past Medical History:   Diagnosis Date    Acute maxillary sinusitis, unspecified 2014     Acute maxillary sinusitis    Cellulitis of buttock 2015     Cellulitis of buttock    Ganglion, unspecified site 2015     Ganglion    Personal history of other diseases of the respiratory system 2015     History of acute sinusitis    Personal history of other diseases of the respiratory system       History of asthma    Personal history of other diseases of urinary system 2015     History of hematuria    Personal history of other specified conditions 2015     History of epistaxis               Social history:  Social History            Tobacco Use    Smoking status: Former       Current packs/day: 0.00       Types: Cigarettes       Quit date: 1975       Years since quittin.3    Smokeless tobacco: Never   Substance Use Topics    Alcohol use: Yes       Comment: 3 drinks a week         Workup:  1. Imaging:  CT maxillofacial: IMPRESSION:  Postsurgical changes from right maxillary mass excision involving the  inferolateral, inferior, and medial maxillary walls as described  above. No definite evidence of residual mass though difficult to  evaluate due to extensive postsurgical changes.     2. Surgery: maxillectomy  with tooth extraction and buccal fat flap     3. Pathology: A. 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     Note: Tumor is noted involving maxillary bone and extending as a pedunculated mass into the maxillary sinus. While areas of conventional ameloblastoma are present, extensive mitotic activity, loss of central stellate reticulum like areas, loss of basilar polarity, and increase in cellularity consistent with malignant transformation are also present. Perineural and lymphovascular invasion are not noted. Carcinoma is present at  the posterior bony margin. Multiple separate specimen were received. Clinical correlation for margin status is advised.     Staging: [id number] ameloblastic carcinoma of the oral cavity      Although mass on initial imaging is 2.5 in largest dimension, T2?      Tumor board recommendations: Surgical resection, likely including revision maxillectomy and possible left neck dissection; radiation and chemotherapy have not proven superior        Note:  Current national standards and recommendations, as well as review of the current literature, were included in the discussions that led to the recommendations above.

## 2024-05-10 ENCOUNTER — TUMOR BOARD CONFERENCE (OUTPATIENT)
Dept: HEMATOLOGY/ONCOLOGY | Facility: HOSPITAL | Age: 69
End: 2024-05-10
Payer: MEDICARE

## 2024-05-14 DIAGNOSIS — D16.5 AMELOBLASTOMA: Primary | ICD-10-CM

## 2024-05-15 ENCOUNTER — LAB (OUTPATIENT)
Dept: LAB | Facility: LAB | Age: 69
End: 2024-05-15
Payer: MEDICARE

## 2024-05-15 DIAGNOSIS — D16.5 AMELOBLASTOMA: ICD-10-CM

## 2024-05-15 LAB
ALBUMIN SERPL BCP-MCNC: 4.3 G/DL (ref 3.4–5)
ANION GAP SERPL CALC-SCNC: 13 MMOL/L (ref 10–20)
BUN SERPL-MCNC: 18 MG/DL (ref 6–23)
CALCIUM SERPL-MCNC: 9.9 MG/DL (ref 8.6–10.6)
CHLORIDE SERPL-SCNC: 104 MMOL/L (ref 98–107)
CO2 SERPL-SCNC: 29 MMOL/L (ref 21–32)
CREAT SERPL-MCNC: 0.94 MG/DL (ref 0.5–1.3)
EGFRCR SERPLBLD CKD-EPI 2021: 88 ML/MIN/1.73M*2
GLUCOSE SERPL-MCNC: 93 MG/DL (ref 74–99)
PHOSPHATE SERPL-MCNC: 3.3 MG/DL (ref 2.5–4.9)
POTASSIUM SERPL-SCNC: 4.4 MMOL/L (ref 3.5–5.3)
SODIUM SERPL-SCNC: 142 MMOL/L (ref 136–145)

## 2024-05-15 PROCEDURE — 36415 COLL VENOUS BLD VENIPUNCTURE: CPT

## 2024-05-15 PROCEDURE — 80069 RENAL FUNCTION PANEL: CPT

## 2024-05-16 ENCOUNTER — HOSPITAL ENCOUNTER (OUTPATIENT)
Dept: RADIOLOGY | Facility: CLINIC | Age: 69
Discharge: HOME | End: 2024-05-16
Payer: MEDICARE

## 2024-05-16 DIAGNOSIS — C41.1: ICD-10-CM

## 2024-05-16 PROCEDURE — 70491 CT SOFT TISSUE NECK W/DYE: CPT | Performed by: RADIOLOGY

## 2024-05-16 PROCEDURE — 70491 CT SOFT TISSUE NECK W/DYE: CPT

## 2024-05-16 PROCEDURE — 2550000001 HC RX 255 CONTRASTS: Performed by: STUDENT IN AN ORGANIZED HEALTH CARE EDUCATION/TRAINING PROGRAM

## 2024-05-16 RX ADMIN — IOHEXOL 75 ML: 350 INJECTION, SOLUTION INTRAVENOUS at 11:12

## 2024-07-02 ENCOUNTER — APPOINTMENT (OUTPATIENT)
Dept: OTOLARYNGOLOGY | Facility: HOSPITAL | Age: 69
End: 2024-07-02
Payer: MEDICARE

## 2025-01-31 ENCOUNTER — APPOINTMENT (OUTPATIENT)
Dept: PRIMARY CARE | Facility: CLINIC | Age: 70
End: 2025-01-31
Payer: MEDICARE

## 2025-01-31 VITALS
DIASTOLIC BLOOD PRESSURE: 78 MMHG | WEIGHT: 185.6 LBS | OXYGEN SATURATION: 96 % | HEIGHT: 70 IN | HEART RATE: 77 BPM | BODY MASS INDEX: 26.57 KG/M2 | SYSTOLIC BLOOD PRESSURE: 122 MMHG

## 2025-01-31 DIAGNOSIS — Z12.11 COLON CANCER SCREENING: ICD-10-CM

## 2025-01-31 DIAGNOSIS — Z00.00 MEDICARE ANNUAL WELLNESS VISIT, SUBSEQUENT: Primary | ICD-10-CM

## 2025-01-31 DIAGNOSIS — D16.5 AMELOBLASTOMA: ICD-10-CM

## 2025-01-31 DIAGNOSIS — E78.5 DYSLIPIDEMIA: ICD-10-CM

## 2025-01-31 DIAGNOSIS — B00.9 HSV (HERPES SIMPLEX VIRUS) INFECTION: ICD-10-CM

## 2025-01-31 DIAGNOSIS — R53.83 OTHER FATIGUE: ICD-10-CM

## 2025-01-31 DIAGNOSIS — Z01.00 ENCOUNTER FOR ROUTINE EYE AND VISION EXAMINATION: ICD-10-CM

## 2025-01-31 DIAGNOSIS — Z13.6 ENCOUNTER FOR ABDOMINAL AORTIC ANEURYSM (AAA) SCREENING: ICD-10-CM

## 2025-01-31 DIAGNOSIS — N52.9 ERECTILE DYSFUNCTION, UNSPECIFIED ERECTILE DYSFUNCTION TYPE: ICD-10-CM

## 2025-01-31 DIAGNOSIS — Z23 IMMUNIZATION DUE: ICD-10-CM

## 2025-01-31 DIAGNOSIS — Z13.0 SCREENING FOR DEFICIENCY ANEMIA: ICD-10-CM

## 2025-01-31 DIAGNOSIS — E55.9 MILD VITAMIN D DEFICIENCY: ICD-10-CM

## 2025-01-31 DIAGNOSIS — Z12.5 PROSTATE CANCER SCREENING: ICD-10-CM

## 2025-01-31 RX ORDER — VALACYCLOVIR HYDROCHLORIDE 500 MG/1
500 TABLET, FILM COATED ORAL 2 TIMES DAILY
Qty: 180 TABLET | Refills: 1 | Status: SHIPPED | OUTPATIENT
Start: 2025-01-31

## 2025-01-31 RX ORDER — SILDENAFIL 100 MG/1
100 TABLET, FILM COATED ORAL
Qty: 30 TABLET | Refills: 0 | Status: SHIPPED | OUTPATIENT
Start: 2025-01-31

## 2025-01-31 ASSESSMENT — PATIENT HEALTH QUESTIONNAIRE - PHQ9
9. THOUGHTS THAT YOU WOULD BE BETTER OFF DEAD, OR OF HURTING YOURSELF: NOT AT ALL
6. FEELING BAD ABOUT YOURSELF - OR THAT YOU ARE A FAILURE OR HAVE LET YOURSELF OR YOUR FAMILY DOWN: NOT AT ALL
8. MOVING OR SPEAKING SO SLOWLY THAT OTHER PEOPLE COULD HAVE NOTICED. OR THE OPPOSITE, BEING SO FIGETY OR RESTLESS THAT YOU HAVE BEEN MOVING AROUND A LOT MORE THAN USUAL: NOT AT ALL
3. TROUBLE FALLING OR STAYING ASLEEP OR SLEEPING TOO MUCH: SEVERAL DAYS
4. FEELING TIRED OR HAVING LITTLE ENERGY: SEVERAL DAYS
1. LITTLE INTEREST OR PLEASURE IN DOING THINGS: NEARLY EVERY DAY
SUM OF ALL RESPONSES TO PHQ QUESTIONS 1-9: 8
7. TROUBLE CONCENTRATING ON THINGS, SUCH AS READING THE NEWSPAPER OR WATCHING TELEVISION: NOT AT ALL
5. POOR APPETITE OR OVEREATING: NOT AT ALL
SUM OF ALL RESPONSES TO PHQ9 QUESTIONS 1 AND 2: 6
2. FEELING DOWN, DEPRESSED OR HOPELESS: NEARLY EVERY DAY
10. IF YOU CHECKED OFF ANY PROBLEMS, HOW DIFFICULT HAVE THESE PROBLEMS MADE IT FOR YOU TO DO YOUR WORK, TAKE CARE OF THINGS AT HOME, OR GET ALONG WITH OTHER PEOPLE: NOT DIFFICULT AT ALL

## 2025-01-31 NOTE — PROGRESS NOTES
Chris Zabala is a 69 y.o. male seen in Clinic at [facility] by Dr. Cornelio Ruelas on 01/31/25 for routine care, as well as for management of the following chronic medical conditions: DLD (mild, not on statin), ameloblastoma (following with dental surgery at [facility], s/p surgical resection). He presents today for CPE/MCW visit. No particular acute concerns     #Ameloblastoma   - following with surgery at [facility], s/p resection   - has done remarkably well     #Sexual Dysfunction   - Sildenafil PRN   - prior hernia operation he thinks may be implicated, 30 years ago   - negative cardiac ROS   - prior testosterone >300 in 2022  [  ] refill today      #DLD  - not on statin   [ ] repeat labs today   [ ] CT coronary calcium scoring      #HSV  - on Valtrex  [  ] refill today     Past Medical History: as above   Past Medical History:   Diagnosis Date    Acute maxillary sinusitis, unspecified 03/21/2014    Acute maxillary sinusitis    Cellulitis of buttock 07/29/2015    Cellulitis of buttock    Ganglion, unspecified site 07/31/2015    Ganglion    Personal history of other diseases of the respiratory system 11/11/2015    History of acute sinusitis    Personal history of other diseases of the respiratory system     History of asthma    Personal history of other diseases of urinary system 07/14/2015    History of hematuria    Personal history of other specified conditions 07/27/2015    History of epistaxis     Subspecialty Medical Care: Surgery at [facility]     Past Surgical History: Hernia surgery (35 years ago); lipoma removal   Past Surgical History:   Procedure Laterality Date    COLONOSCOPY  07/14/2015    Complete Colonoscopy    HERNIA REPAIR  07/14/2015    Hernia Repair    TONSILLECTOMY  07/27/2015    Tonsillectomy     Medications:  Current Outpatient Medications:     VITAMIN B COMPLEX ORAL, Take 1 capsule by mouth once daily., Disp: , Rfl:     sildenafil (Viagra) 100 mg tablet, Take 1 tablet (100 mg) by mouth once daily., Disp: 30  "tablet, Rfl: 0    valACYclovir (Valtrex) 500 mg tablet, Take 1 tablet (500 mg) by mouth 2 times a day., Disp: 180 tablet, Rfl: 1  Pharmacy: [facility]    Allergies:   - Methacarbamol--intolerance  - Penicillin--in youth (sensitivity); has tolerated in adulthood without issue  Allergies   Allergen Reactions    Cefaclor Unknown    Clarithromycin Unknown    House Dust Mite Unknown    Methocarbamol Unknown     Immunizations:   - Flu UTD 2024  - COVID UTD 2024  - RSV at 75  - Tdap due 2025; given today   - PCV-20 deferred   - prior Zostavax, considering Shingrix     Family History: sister with DLD (despite being thin, vegetarian)  - mother with partial colectomy (lived to 90); iso polyps per patient   - father with AAA   No family history on file.    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[employer])   Activities: musician   Drug Use: smoker from age [age]  Diet: no dietary concerns  Depression/Anxiety: depression 2/2 world events  Sexuality/Contraception/Menstrual History: STI screening negative 2022  Sleep: no sleep concerns      Patient Information:  Health Insurance: has insurance   Transportation: drives  Healthcare POA/Guardian: emergency contact, brother [phone]   Contact Information: [phone]    Visit Vitals  /71 (BP Location: Left arm, Patient Position: Standing, BP Cuff Size: Adult)   Pulse 102   Ht 1.778 m (5' 10\")   Wt 84.2 kg (185 lb 9.6 oz)   SpO2 95%   BMI 26.63 kg/m²   Smoking Status Former   BSA 2.04 m²      PHYSICAL EXAM:   General: well appearing  male, NAD, pleasant and engaged in encounter    HEENT: NCAT, MMM; surgical scars (very well healed) in setting of Ameloblastoma; palate lesion noted on R side following surgery   CV: RRR, no m/r/g  PULM: CTAB, non-labored respirations   ABD: soft, NT, ND  : no suprapubic or CVA tenderness   EXT: WWP, no significant edema   SKIN: no rashes noted "   NEURO: A&Ox4, symmetric facies, no gross motor or sensory deficits, normal gait  PSYCH: pleasant mood, appropriate affect     Assessment/Plan    Chris Zabala is a 69 y.o. male [place] [facility] [place] Cornelio Ruelas on 01/31/25 for routine care, as well as for management of the following chronic medical conditions: DLD (mild, not on statin), ameloblastoma (following with dental surgery at [facility], s/p surgical resection). He presents today for CPE/MCW visit. No particular acute concerns     #Ameloblastoma   - following with surgery at [facility], s/p resection   - has done remarkably well     #Sexual Dysfunction   - Sildenafil PRN   - prior hernia operation he thinks may be implicated, 30 years ago   - negative cardiac ROS   - prior testosterone >300 in 2022  [  ] refill today      #DLD  - not on statin   [ ] repeat labs today   [ ] CT coronary calcium scoring      #HSV  - on Valtrex  [  ] refill today     #Health Maintenance  CPE/MCW Visit: 1/31/2025     Cancer Screening  - Colorectal Cancer Screening: colo referral in past deferred; COLOGUARD negative 12/2023; interested in discussion with GI given mother's history after our visit today for which referral to Dr. Asif provided   - Lung Cancer Screening: not candidate  - Prostate Cancer Screening: labs today      Laboratory Screening  - Lipid Screen: labs today   - ASCVD Score: >7.5%, defers statin for now, pursue CAC scoring   - A1C, glucose screen: labs today   - STI, HIV, Hep B screen: negative 2022  - Hep C screen: negative 2022     Imaging Screening  - AAA screening: screening re-ordered today      Immunizations  Immunizations:   - Flu UTD 2024  - COVID UTD 2024  - RSV at 76  - Tdap due 2025; given today   - PCV-20 deferred   - prior Zostavax, considering Shingrix      Other Screening  - Health Literacy Assessment: appropriate   - Hearing/Vision Screening: Optho referral today for evaluation   - Depression screen: partial positive; continue to monitor  -  Home safety/partner violence screen: negative  - Alcohol/tobacco/drug use screen: former smoker  - Healthcare POA/Advanced Directives: brother      Referrals: labs, AAA screening, GI referral, CAC scoring, medication refills, Tdap, Optho referral     Patient Discussion:    Please call back the office with any questions at [phone]. In the case of an emergency, please call 911 or go to the nearest Emergency Department.      Cornelio Ruelas MD  Internal Medicine-Pediatrics  [facility] [address], [address]  P: [phone], F: [phone]

## 2025-02-01 ASSESSMENT — ENCOUNTER SYMPTOMS
OCCASIONAL FEELINGS OF UNSTEADINESS: 0
LOSS OF SENSATION IN FEET: 0
DEPRESSION: 1

## 2025-02-01 ASSESSMENT — ACTIVITIES OF DAILY LIVING (ADL)
MANAGING_FINANCES: INDEPENDENT
TAKING_MEDICATION: INDEPENDENT
DRESSING: INDEPENDENT
BATHING: INDEPENDENT
DOING_HOUSEWORK: INDEPENDENT
GROCERY_SHOPPING: INDEPENDENT

## 2025-02-24 ENCOUNTER — HOSPITAL ENCOUNTER (OUTPATIENT)
Dept: RADIOLOGY | Facility: CLINIC | Age: 70
Discharge: HOME | End: 2025-02-24
Payer: MEDICARE

## 2025-02-24 DIAGNOSIS — Z13.6 ENCOUNTER FOR ABDOMINAL AORTIC ANEURYSM (AAA) SCREENING: ICD-10-CM

## 2025-02-24 PROCEDURE — 76706 US ABDL AORTA SCREEN AAA: CPT | Performed by: RADIOLOGY

## 2025-02-24 PROCEDURE — 76706 US ABDL AORTA SCREEN AAA: CPT

## 2025-03-12 ENCOUNTER — APPOINTMENT (OUTPATIENT)
Dept: GASTROENTEROLOGY | Facility: CLINIC | Age: 70
End: 2025-03-12
Payer: MEDICARE

## 2025-03-12 VITALS — BODY MASS INDEX: 25.9 KG/M2 | HEIGHT: 71 IN | WEIGHT: 185 LBS | OXYGEN SATURATION: 96 % | HEART RATE: 84 BPM

## 2025-03-12 DIAGNOSIS — K64.4 ANAL SKIN TAG: Primary | ICD-10-CM

## 2025-03-12 DIAGNOSIS — Z12.11 COLON CANCER SCREENING: ICD-10-CM

## 2025-03-12 PROCEDURE — 1159F MED LIST DOCD IN RCRD: CPT | Performed by: INTERNAL MEDICINE

## 2025-03-12 PROCEDURE — 1123F ACP DISCUSS/DSCN MKR DOCD: CPT | Performed by: INTERNAL MEDICINE

## 2025-03-12 PROCEDURE — 99204 OFFICE O/P NEW MOD 45 MIN: CPT | Performed by: INTERNAL MEDICINE

## 2025-03-12 PROCEDURE — 3008F BODY MASS INDEX DOCD: CPT | Performed by: INTERNAL MEDICINE

## 2025-03-12 ASSESSMENT — ENCOUNTER SYMPTOMS: SHORTNESS OF BREATH: 0

## 2025-03-12 NOTE — PROGRESS NOTES
REASON FOR VISIT:  Colonoscopy   PCP (requesting provider): Cornelio Ruelas MD.    HPI:  Chris Zabala is a 70 y.o. male with a past medical history of ameloblastoma s/p resection and HLD being evaluated for colonoscopy. Cologuard negative (12/2023).     The patient reports there is a family history of colon polyps in his mother and she had to have colonic resection. Patient reports he has a nodule on his rectum. He did a Cologuard as above. He reports prior colonoscopy over 10 years ago and thinks it was at . He reports it was unremarkable. He has a friend who had a perforation and this makes him hesitant to have a colonoscopy. He also worries about cleanliness of the scope. He feels a small bump in anal area when wiping and with cleaning. He has occasional spotting of blood from hemorrhoids. He has a bowel movement 1-2 times day. No issues with sedation. No blood thinners. No neck surgeries.     PSurgHx:   -Right inguinal hernia repair     FamHx: No GI cancer     PAST MEDICAL HISTORY  Past Medical History:   Diagnosis Date    Acute maxillary sinusitis, unspecified 03/21/2014    Acute maxillary sinusitis    Cellulitis of buttock 07/29/2015    Cellulitis of buttock    Ganglion, unspecified site 07/31/2015    Ganglion    Personal history of other diseases of the respiratory system 11/11/2015    History of acute sinusitis    Personal history of other diseases of the respiratory system     History of asthma    Personal history of other diseases of urinary system 07/14/2015    History of hematuria    Personal history of other specified conditions 07/27/2015    History of epistaxis       PAST SURGICAL HISTORY  Past Surgical History:   Procedure Laterality Date    COLONOSCOPY  07/14/2015    Complete Colonoscopy    HERNIA REPAIR  07/14/2015    Hernia Repair    TONSILLECTOMY  07/27/2015    Tonsillectomy       FAMILY HISTORY  No family history on file.    SOCIAL HISTORY   reports that he quit smoking about 50 years  ago. His smoking use included cigarettes. He has never used smokeless tobacco. He reports current alcohol use. He reports that he does not use drugs.    REVIEW OF SYSTEMS  Review of Systems   Respiratory:  Negative for shortness of breath.    Cardiovascular:  Negative for chest pain.   All other systems reviewed and are negative.    A 10+ point review of systems was otherwise negative except as noted and per HPI.    ALLERGIES  Allergies   Allergen Reactions    Animal Dander Itching    Cefaclor Unknown and GI Upset    Clarithromycin Unknown    House Dust Mite Unknown and Itching    Methocarbamol Unknown    Penicillin Unknown    Pollen Extracts Itching       MEDICATIONS  Current Outpatient Medications   Medication Instructions    sildenafil (VIAGRA) 100 mg, oral, Daily RT    valACYclovir (VALTREX) 500 mg, oral, 2 times daily    VITAMIN B COMPLEX ORAL 1 capsule, Daily RT       VITALS  Vitals:    03/12/25 1023   Pulse: 84   SpO2: 96%      Body mass index is 26.17 kg/m².    PHYSICAL EXAM  CONSTITUTIONAL: NAD, appears stated age  EYES: anicteric sclera, sclera clear  HEAD: normocephalic, atraumatic   NECK: supple   PULMONARY: CTAB  CARDIOVASCULAR: RRR, no M/R/G appreciated   ABDOMEN: soft, NTND, +BS, no rebound or guarding   MUSCULOSKELETAL: no edema  SKIN: no jaundice   PSYCHIATRIC: AOx3, appropriate insight and judgement  RECTAL: patient offered chaperone but he declined, small visible and palpable hemorrhoid, small skin tag, no hard palpable mass lesions, good sphincter tone, clean vault    LABS  WBC   Date Value   11/22/2023 6.5 x10*3/uL   10/28/2022 6.9 x10E9/L     Hemoglobin (g/dL)   Date Value   11/22/2023 14.8   10/28/2022 15.6     Platelets   Date Value   11/22/2023 260 x10*3/uL   10/28/2022 249 x10E9/L     Sodium (mmol/L)   Date Value   05/15/2024 142   03/21/2024 135 (L)   11/22/2023 140     Potassium (mmol/L)   Date Value   05/15/2024 4.4   03/21/2024 4.5   11/22/2023 5.0     Chloride (mmol/L)   Date Value  "  05/15/2024 104   03/21/2024 103   11/22/2023 104     Bicarbonate (mmol/L)   Date Value   05/15/2024 29   03/21/2024 25   11/22/2023 28     Urea Nitrogen (mg/dL)   Date Value   05/15/2024 18   03/21/2024 22   11/22/2023 18     Creatinine (mg/dL)   Date Value   05/15/2024 0.94   03/21/2024 0.96   11/22/2023 0.98     Calcium (mg/dL)   Date Value   05/15/2024 9.9   03/21/2024 9.2   11/22/2023 9.7     Total Protein (g/dL)   Date Value   11/22/2023 7.0   10/28/2022 7.3     Bilirubin, Total (mg/dL)   Date Value   11/22/2023 0.8     Total Bilirubin (mg/dL)   Date Value   10/28/2022 0.5     Alkaline Phosphatase (U/L)   Date Value   11/22/2023 50   10/28/2022 49     ALT (U/L)   Date Value   11/22/2023 17     ALT (SGPT) (U/L)   Date Value   10/28/2022 16     AST (U/L)   Date Value   11/22/2023 20   10/28/2022 17     Glucose (mg/dL)   Date Value   05/15/2024 93   03/21/2024 135 (H)   11/22/2023 94     No results found for: \"LIPASE\", \"CRP\"    ASSESSMENT/PLAN  Chris Zabala is a 70 y.o. male with a past medical history of ameloblastoma s/p resection and HLD being evaluated for colonoscopy. Cologuard negative (12/2023). We discussed options regarding colon cancer screening moving forward. He will give it some thought and either continue with Cologuard when due versus reach out to the office to schedule a screening colonoscopy. The rectal lesion he is feeling is a small and benign appearing skin tag on rectal exam.    -Advised patient he is either due for Cologuard in 12/2026 or he can reach out to the office to schedule a colonoscopy in the future if he decides to pursue this option     Follow-up in the office PRN.    Signature: Chris Lam MD  "

## 2025-03-12 NOTE — PATIENT INSTRUCTIONS
You are either due for Cologuard in 12/2026 or call the office at [phone] option 1 to schedule a colonoscopy. You have an anal skin tag which is a benign lesion.

## 2025-04-14 ENCOUNTER — APPOINTMENT (OUTPATIENT)
Dept: GASTROENTEROLOGY | Facility: CLINIC | Age: 70
End: 2025-04-14
Payer: MEDICARE

## 2025-05-19 ENCOUNTER — HOSPITAL ENCOUNTER (OUTPATIENT)
Dept: RADIOLOGY | Facility: HOSPITAL | Age: 70
Discharge: HOME | End: 2025-05-19
Payer: MEDICARE

## 2025-05-19 DIAGNOSIS — E78.5 DYSLIPIDEMIA: ICD-10-CM

## 2025-05-19 PROCEDURE — 75571 CT HRT W/O DYE W/CA TEST: CPT

## 2025-05-20 DIAGNOSIS — C41.1: ICD-10-CM

## 2025-05-20 DIAGNOSIS — Z12.5 PROSTATE CANCER SCREENING: ICD-10-CM

## 2025-05-20 DIAGNOSIS — E55.9 MILD VITAMIN D DEFICIENCY: ICD-10-CM

## 2025-05-20 DIAGNOSIS — D16.4 BENIGN NEOPLASM OF BONES OF SKULL AND FACE: ICD-10-CM

## 2025-05-20 DIAGNOSIS — E78.5 DYSLIPIDEMIA: Primary | ICD-10-CM

## 2025-05-20 DIAGNOSIS — Z13.6 ENCOUNTER FOR ABDOMINAL AORTIC ANEURYSM (AAA) SCREENING: ICD-10-CM

## 2025-05-20 RX ORDER — ROSUVASTATIN CALCIUM 20 MG/1
20 TABLET, COATED ORAL DAILY
Qty: 100 TABLET | Refills: 3 | Status: SHIPPED | OUTPATIENT
Start: 2025-05-20 | End: 2026-06-24

## 2025-08-03 ASSESSMENT — EXTERNAL EXAM - RIGHT EYE: OD_EXAM: NORMAL

## 2025-08-03 ASSESSMENT — EXTERNAL EXAM - LEFT EYE: OS_EXAM: NORMAL

## 2025-08-03 ASSESSMENT — SLIT LAMP EXAM - LIDS: COMMENTS: GOOD POSITION

## 2025-08-12 ENCOUNTER — APPOINTMENT (OUTPATIENT)
Dept: OPHTHALMOLOGY | Facility: CLINIC | Age: 70
End: 2025-08-12
Payer: MEDICARE

## 2025-08-12 DIAGNOSIS — H52.203 ASTIGMATISM OF BOTH EYES, UNSPECIFIED TYPE: ICD-10-CM

## 2025-08-12 DIAGNOSIS — H25.812 COMBINED FORM OF AGE-RELATED CATARACT, LEFT EYE: ICD-10-CM

## 2025-08-12 DIAGNOSIS — H52.4 PRESBYOPIA: ICD-10-CM

## 2025-08-12 DIAGNOSIS — H25.811 COMBINED FORM OF AGE-RELATED CATARACT, RIGHT EYE: ICD-10-CM

## 2025-08-12 DIAGNOSIS — H40.003 GLAUCOMA SUSPECT OF BOTH EYES: Primary | ICD-10-CM

## 2025-08-12 DIAGNOSIS — Z83.518 FAMILY HISTORY OF MACULAR DEGENERATION: ICD-10-CM

## 2025-08-12 PROCEDURE — 92133 CPTRZD OPH DX IMG PST SGM ON: CPT | Performed by: OPHTHALMOLOGY

## 2025-08-12 PROCEDURE — 92004 COMPRE OPH EXAM NEW PT 1/>: CPT | Performed by: OPHTHALMOLOGY

## 2025-08-12 ASSESSMENT — REFRACTION_WEARINGRX
OS_SPHERE: +2.00
SPECS_TYPE: OTC
OD_SPHERE: +2.00

## 2025-08-12 ASSESSMENT — TONOMETRY
OS_IOP_MMHG: 15
OD_IOP_MMHG: 17
IOP_METHOD: GOLDMANN APPLANATION

## 2025-08-12 ASSESSMENT — VISUAL ACUITY
OS_SC+: -2
METHOD: SNELLEN - LINEAR
OD_SC: 20/25
OS_SC: 20/20
OD_SC+: +2

## 2025-08-12 ASSESSMENT — REFRACTION_MANIFEST
OS_ADD: +2.00
OD_CYLINDER: -0.50
OS_AXIS: 180
OS_CYLINDER: -0.75
OD_ADD: +2.00
OD_AXIS: 010
OD_SPHERE: +0.00
OD_CYLINDER: -0.75
METHOD_AUTOREFRACTION: 1
OD_SPHERE: +0.25
OS_AXIS: 014
OS_SPHERE: -0.25
OS_CYLINDER: -1.00
OD_AXIS: 179
OS_SPHERE: +0.50

## 2025-08-12 ASSESSMENT — CONF VISUAL FIELD
OS_INFERIOR_TEMPORAL_RESTRICTION: 0
OD_INFERIOR_NASAL_RESTRICTION: 0
OD_SUPERIOR_TEMPORAL_RESTRICTION: 0
OD_INFERIOR_TEMPORAL_RESTRICTION: 0
OD_NORMAL: 1
OS_INFERIOR_NASAL_RESTRICTION: 0
OD_SUPERIOR_NASAL_RESTRICTION: 0
OS_SUPERIOR_NASAL_RESTRICTION: 0
OS_SUPERIOR_TEMPORAL_RESTRICTION: 0
OS_NORMAL: 1

## 2025-08-12 ASSESSMENT — CUP TO DISC RATIO
OD_RATIO: 0.5
OS_RATIO: 0.45

## 2026-01-13 ENCOUNTER — APPOINTMENT (OUTPATIENT)
Dept: OPHTHALMOLOGY | Facility: CLINIC | Age: 71
End: 2026-01-13
Payer: MEDICARE

## 2026-02-03 ENCOUNTER — APPOINTMENT (OUTPATIENT)
Dept: PRIMARY CARE | Facility: CLINIC | Age: 71
End: 2026-02-03
Payer: MEDICARE

## (undated) DEVICE — SUTURE, VICRYL, 4-0, 18 IN, UNDYED BR PS-2

## (undated) DEVICE — Device

## (undated) DEVICE — SUTURE, SURGICAL STEEL, 2, 18 IN, MULTIPACK

## (undated) DEVICE — CATHETER, URETHRAL, PEZZER, 3 CM HEAD, 36 FR, LATEX

## (undated) DEVICE — KNIFE, IQ APEX, 11CM

## (undated) DEVICE — COVER, CART, 45 X 27 X 48 IN, CLEAR

## (undated) DEVICE — PAD, GROUNDING, ELECTROSURGICAL, W/9 FT CABLE, POLYHESIVE II, ADULT, LF

## (undated) DEVICE — CASSETTE, SONOPET IQ IRRIGATION SUCTION

## (undated) DEVICE — WOUND SYSTEM, DEBRIDEMENT & CLEANING, O.R DUOPAK

## (undated) DEVICE — GLOVE, SURGICAL, PROTEXIS PI ORTHO, 7.5, PF, LF

## (undated) DEVICE — MANIFOLD, 4 PORT NEPTUNE STANDARD

## (undated) DEVICE — REST, HEAD, BAGEL, 9 IN